# Patient Record
Sex: MALE | Race: WHITE | NOT HISPANIC OR LATINO
[De-identification: names, ages, dates, MRNs, and addresses within clinical notes are randomized per-mention and may not be internally consistent; named-entity substitution may affect disease eponyms.]

---

## 2017-08-08 ENCOUNTER — APPOINTMENT (OUTPATIENT)
Dept: NEUROSURGERY | Facility: HOSPITAL | Age: 72
End: 2017-08-08

## 2017-08-08 ENCOUNTER — RESULT REVIEW (OUTPATIENT)
Age: 72
End: 2017-08-08

## 2017-08-08 ENCOUNTER — INPATIENT (INPATIENT)
Facility: HOSPITAL | Age: 72
LOS: 5 days | Discharge: NURSING, REHAB OR LONG-TERM CARE | DRG: 23 | End: 2017-08-14
Attending: PSYCHIATRY & NEUROLOGY | Admitting: PSYCHIATRY & NEUROLOGY
Payer: MEDICARE

## 2017-08-08 ENCOUNTER — TRANSCRIPTION ENCOUNTER (OUTPATIENT)
Age: 72
End: 2017-08-08

## 2017-08-08 VITALS
OXYGEN SATURATION: 100 % | SYSTOLIC BLOOD PRESSURE: 183 MMHG | HEART RATE: 63 BPM | RESPIRATION RATE: 18 BRPM | DIASTOLIC BLOOD PRESSURE: 98 MMHG | TEMPERATURE: 98 F

## 2017-08-08 DIAGNOSIS — I61.9 NONTRAUMATIC INTRACEREBRAL HEMORRHAGE, UNSPECIFIED: ICD-10-CM

## 2017-08-08 LAB
ALBUMIN SERPL ELPH-MCNC: 4.5 G/DL — SIGNIFICANT CHANGE UP (ref 3.3–5)
ALP SERPL-CCNC: 53 U/L — SIGNIFICANT CHANGE UP (ref 40–120)
ALT FLD-CCNC: 21 U/L RC — SIGNIFICANT CHANGE UP (ref 10–45)
ANION GAP SERPL CALC-SCNC: 12 MMOL/L — SIGNIFICANT CHANGE UP (ref 5–17)
ANION GAP SERPL CALC-SCNC: 14 MMOL/L — SIGNIFICANT CHANGE UP (ref 5–17)
APPEARANCE UR: CLEAR — SIGNIFICANT CHANGE UP
APTT BLD: 30.3 SEC — SIGNIFICANT CHANGE UP (ref 27.5–37.4)
AST SERPL-CCNC: 22 U/L — SIGNIFICANT CHANGE UP (ref 10–40)
BASOPHILS # BLD AUTO: 0 K/UL — SIGNIFICANT CHANGE UP (ref 0–0.2)
BASOPHILS NFR BLD AUTO: 0.7 % — SIGNIFICANT CHANGE UP (ref 0–2)
BILIRUB SERPL-MCNC: 0.4 MG/DL — SIGNIFICANT CHANGE UP (ref 0.2–1.2)
BILIRUB UR-MCNC: NEGATIVE — SIGNIFICANT CHANGE UP
BLD GP AB SCN SERPL QL: NEGATIVE — SIGNIFICANT CHANGE UP
BUN SERPL-MCNC: 10 MG/DL — SIGNIFICANT CHANGE UP (ref 7–23)
BUN SERPL-MCNC: 21 MG/DL — SIGNIFICANT CHANGE UP (ref 7–23)
CALCIUM SERPL-MCNC: 8.3 MG/DL — LOW (ref 8.4–10.5)
CALCIUM SERPL-MCNC: 9.5 MG/DL — SIGNIFICANT CHANGE UP (ref 8.4–10.5)
CHLORIDE SERPL-SCNC: 102 MMOL/L — SIGNIFICANT CHANGE UP (ref 96–108)
CHLORIDE SERPL-SCNC: 106 MMOL/L — SIGNIFICANT CHANGE UP (ref 96–108)
CO2 SERPL-SCNC: 24 MMOL/L — SIGNIFICANT CHANGE UP (ref 22–31)
CO2 SERPL-SCNC: 27 MMOL/L — SIGNIFICANT CHANGE UP (ref 22–31)
COLOR SPEC: COLORLESS — SIGNIFICANT CHANGE UP
CREAT SERPL-MCNC: 0.6 MG/DL — SIGNIFICANT CHANGE UP (ref 0.5–1.3)
CREAT SERPL-MCNC: 0.73 MG/DL — SIGNIFICANT CHANGE UP (ref 0.5–1.3)
DIFF PNL FLD: NEGATIVE — SIGNIFICANT CHANGE UP
EOSINOPHIL # BLD AUTO: 0 K/UL — SIGNIFICANT CHANGE UP (ref 0–0.5)
EOSINOPHIL NFR BLD AUTO: 0.6 % — SIGNIFICANT CHANGE UP (ref 0–6)
GAS PNL BLDA: SIGNIFICANT CHANGE UP
GLUCOSE SERPL-MCNC: 111 MG/DL — HIGH (ref 70–99)
GLUCOSE SERPL-MCNC: 142 MG/DL — HIGH (ref 70–99)
GLUCOSE UR QL: NEGATIVE — SIGNIFICANT CHANGE UP
HCT VFR BLD CALC: 33.3 % — LOW (ref 39–50)
HCT VFR BLD CALC: 41.7 % — SIGNIFICANT CHANGE UP (ref 39–50)
HGB BLD-MCNC: 11.6 G/DL — LOW (ref 13–17)
HGB BLD-MCNC: 14.4 G/DL — SIGNIFICANT CHANGE UP (ref 13–17)
INR BLD: 1.07 RATIO — SIGNIFICANT CHANGE UP (ref 0.88–1.16)
INR BLD: 1.09 RATIO — SIGNIFICANT CHANGE UP (ref 0.88–1.16)
KETONES UR-MCNC: NEGATIVE — SIGNIFICANT CHANGE UP
LEUKOCYTE ESTERASE UR-ACNC: NEGATIVE — SIGNIFICANT CHANGE UP
LYMPHOCYTES # BLD AUTO: 2 K/UL — SIGNIFICANT CHANGE UP (ref 1–3.3)
LYMPHOCYTES # BLD AUTO: 28.5 % — SIGNIFICANT CHANGE UP (ref 13–44)
MAGNESIUM SERPL-MCNC: 2.1 MG/DL — SIGNIFICANT CHANGE UP (ref 1.6–2.6)
MCHC RBC-ENTMCNC: 34.3 PG — HIGH (ref 27–34)
MCHC RBC-ENTMCNC: 34.5 PG — HIGH (ref 27–34)
MCHC RBC-ENTMCNC: 34.6 GM/DL — SIGNIFICANT CHANGE UP (ref 32–36)
MCHC RBC-ENTMCNC: 34.9 GM/DL — SIGNIFICANT CHANGE UP (ref 32–36)
MCV RBC AUTO: 98.8 FL — SIGNIFICANT CHANGE UP (ref 80–100)
MCV RBC AUTO: 99.3 FL — SIGNIFICANT CHANGE UP (ref 80–100)
MONOCYTES # BLD AUTO: 0.5 K/UL — SIGNIFICANT CHANGE UP (ref 0–0.9)
MONOCYTES NFR BLD AUTO: 7.3 % — SIGNIFICANT CHANGE UP (ref 2–14)
NEUTROPHILS # BLD AUTO: 4.5 K/UL — SIGNIFICANT CHANGE UP (ref 1.8–7.4)
NEUTROPHILS NFR BLD AUTO: 62.9 % — SIGNIFICANT CHANGE UP (ref 43–77)
NITRITE UR-MCNC: NEGATIVE — SIGNIFICANT CHANGE UP
PH UR: 7 — SIGNIFICANT CHANGE UP (ref 5–8)
PHOSPHATE SERPL-MCNC: 3.3 MG/DL — SIGNIFICANT CHANGE UP (ref 2.5–4.5)
PLATELET # BLD AUTO: 162 K/UL — SIGNIFICANT CHANGE UP (ref 150–400)
PLATELET # BLD AUTO: 180 K/UL — SIGNIFICANT CHANGE UP (ref 150–400)
POTASSIUM SERPL-MCNC: 3.4 MMOL/L — LOW (ref 3.5–5.3)
POTASSIUM SERPL-MCNC: 3.8 MMOL/L — SIGNIFICANT CHANGE UP (ref 3.5–5.3)
POTASSIUM SERPL-SCNC: 3.4 MMOL/L — LOW (ref 3.5–5.3)
POTASSIUM SERPL-SCNC: 3.8 MMOL/L — SIGNIFICANT CHANGE UP (ref 3.5–5.3)
PROT SERPL-MCNC: 7.5 G/DL — SIGNIFICANT CHANGE UP (ref 6–8.3)
PROT UR-MCNC: NEGATIVE — SIGNIFICANT CHANGE UP
PROTHROM AB SERPL-ACNC: 11.6 SEC — SIGNIFICANT CHANGE UP (ref 9.8–12.7)
PROTHROM AB SERPL-ACNC: 11.9 SEC — SIGNIFICANT CHANGE UP (ref 9.8–12.7)
RBC # BLD: 3.37 M/UL — LOW (ref 4.2–5.8)
RBC # BLD: 4.2 M/UL — SIGNIFICANT CHANGE UP (ref 4.2–5.8)
RBC # FLD: 11.4 % — SIGNIFICANT CHANGE UP (ref 10.3–14.5)
RBC # FLD: 11.4 % — SIGNIFICANT CHANGE UP (ref 10.3–14.5)
RH IG SCN BLD-IMP: POSITIVE — SIGNIFICANT CHANGE UP
RH IG SCN BLD-IMP: POSITIVE — SIGNIFICANT CHANGE UP
SODIUM SERPL-SCNC: 141 MMOL/L — SIGNIFICANT CHANGE UP (ref 135–145)
SODIUM SERPL-SCNC: 144 MMOL/L — SIGNIFICANT CHANGE UP (ref 135–145)
SP GR SPEC: 1.02 — SIGNIFICANT CHANGE UP (ref 1.01–1.02)
UROBILINOGEN FLD QL: NEGATIVE — SIGNIFICANT CHANGE UP
WBC # BLD: 10 K/UL — SIGNIFICANT CHANGE UP (ref 3.8–10.5)
WBC # BLD: 7.2 K/UL — SIGNIFICANT CHANGE UP (ref 3.8–10.5)
WBC # FLD AUTO: 10 K/UL — SIGNIFICANT CHANGE UP (ref 3.8–10.5)
WBC # FLD AUTO: 7.2 K/UL — SIGNIFICANT CHANGE UP (ref 3.8–10.5)

## 2017-08-08 PROCEDURE — 88342 IMHCHEM/IMCYTCHM 1ST ANTB: CPT | Mod: 26

## 2017-08-08 PROCEDURE — 99292 CRITICAL CARE ADDL 30 MIN: CPT

## 2017-08-08 PROCEDURE — 70450 CT HEAD/BRAIN W/O DYE: CPT | Mod: 26,77

## 2017-08-08 PROCEDURE — 61313 CRNEC/CRNOT STTL ICERE: CPT

## 2017-08-08 PROCEDURE — 88307 TISSUE EXAM BY PATHOLOGIST: CPT | Mod: 26

## 2017-08-08 PROCEDURE — 70450 CT HEAD/BRAIN W/O DYE: CPT | Mod: 26

## 2017-08-08 PROCEDURE — 99291 CRITICAL CARE FIRST HOUR: CPT

## 2017-08-08 PROCEDURE — 93010 ELECTROCARDIOGRAM REPORT: CPT

## 2017-08-08 PROCEDURE — 99285 EMERGENCY DEPT VISIT HI MDM: CPT | Mod: 25,GC

## 2017-08-08 RX ORDER — DEXMEDETOMIDINE HYDROCHLORIDE IN 0.9% SODIUM CHLORIDE 4 UG/ML
0.2 INJECTION INTRAVENOUS
Qty: 200 | Refills: 0 | Status: DISCONTINUED | OUTPATIENT
Start: 2017-08-08 | End: 2017-08-08

## 2017-08-08 RX ORDER — DESMOPRESSIN ACETATE 0.1 MG/1
25.5 TABLET ORAL ONCE
Qty: 0 | Refills: 0 | Status: DISCONTINUED | OUTPATIENT
Start: 2017-08-08 | End: 2017-08-08

## 2017-08-08 RX ORDER — ONDANSETRON 8 MG/1
4 TABLET, FILM COATED ORAL EVERY 6 HOURS
Qty: 0 | Refills: 0 | Status: DISCONTINUED | OUTPATIENT
Start: 2017-08-08 | End: 2017-08-14

## 2017-08-08 RX ORDER — ACETAMINOPHEN 500 MG
650 TABLET ORAL EVERY 6 HOURS
Qty: 0 | Refills: 0 | Status: DISCONTINUED | OUTPATIENT
Start: 2017-08-08 | End: 2017-08-14

## 2017-08-08 RX ORDER — SODIUM CHLORIDE 9 MG/ML
250 INJECTION INTRAMUSCULAR; INTRAVENOUS; SUBCUTANEOUS ONCE
Qty: 0 | Refills: 0 | Status: COMPLETED | OUTPATIENT
Start: 2017-08-08 | End: 2017-08-08

## 2017-08-08 RX ORDER — ONDANSETRON 8 MG/1
4 TABLET, FILM COATED ORAL ONCE
Qty: 0 | Refills: 0 | Status: COMPLETED | OUTPATIENT
Start: 2017-08-08 | End: 2017-08-08

## 2017-08-08 RX ORDER — DESMOPRESSIN ACETATE 0.1 MG/1
26 TABLET ORAL ONCE
Qty: 0 | Refills: 0 | Status: COMPLETED | OUTPATIENT
Start: 2017-08-08 | End: 2017-08-08

## 2017-08-08 RX ORDER — MANNITOL
100 POWDER (GRAM) MISCELLANEOUS ONCE
Qty: 0 | Refills: 0 | Status: DISCONTINUED | OUTPATIENT
Start: 2017-08-08 | End: 2017-08-08

## 2017-08-08 RX ORDER — LEVETIRACETAM 250 MG/1
500 TABLET, FILM COATED ORAL EVERY 12 HOURS
Qty: 0 | Refills: 0 | Status: DISCONTINUED | OUTPATIENT
Start: 2017-08-08 | End: 2017-08-09

## 2017-08-08 RX ORDER — POTASSIUM CHLORIDE 20 MEQ
10 PACKET (EA) ORAL
Qty: 0 | Refills: 0 | Status: COMPLETED | OUTPATIENT
Start: 2017-08-08 | End: 2017-08-09

## 2017-08-08 RX ORDER — MANNITOL
100 POWDER (GRAM) MISCELLANEOUS ONCE
Qty: 0 | Refills: 0 | Status: COMPLETED | OUTPATIENT
Start: 2017-08-08 | End: 2017-08-08

## 2017-08-08 RX ORDER — DEXTROSE MONOHYDRATE, SODIUM CHLORIDE, AND POTASSIUM CHLORIDE 50; .745; 4.5 G/1000ML; G/1000ML; G/1000ML
1000 INJECTION, SOLUTION INTRAVENOUS
Qty: 0 | Refills: 0 | Status: DISCONTINUED | OUTPATIENT
Start: 2017-08-08 | End: 2017-08-09

## 2017-08-08 RX ORDER — DEXMEDETOMIDINE HYDROCHLORIDE IN 0.9% SODIUM CHLORIDE 4 UG/ML
0.2 INJECTION INTRAVENOUS
Qty: 200 | Refills: 0 | Status: DISCONTINUED | OUTPATIENT
Start: 2017-08-08 | End: 2017-08-09

## 2017-08-08 RX ORDER — DOCUSATE SODIUM 100 MG
100 CAPSULE ORAL THREE TIMES A DAY
Qty: 0 | Refills: 0 | Status: DISCONTINUED | OUTPATIENT
Start: 2017-08-08 | End: 2017-08-14

## 2017-08-08 RX ORDER — FENTANYL CITRATE 50 UG/ML
25 INJECTION INTRAVENOUS ONCE
Qty: 0 | Refills: 0 | Status: DISCONTINUED | OUTPATIENT
Start: 2017-08-08 | End: 2017-08-08

## 2017-08-08 RX ORDER — POTASSIUM CHLORIDE 20 MEQ
40 PACKET (EA) ORAL ONCE
Qty: 0 | Refills: 0 | Status: DISCONTINUED | OUTPATIENT
Start: 2017-08-08 | End: 2017-08-08

## 2017-08-08 RX ORDER — LEVETIRACETAM 250 MG/1
1000 TABLET, FILM COATED ORAL ONCE
Qty: 0 | Refills: 0 | Status: COMPLETED | OUTPATIENT
Start: 2017-08-08 | End: 2017-08-08

## 2017-08-08 RX ORDER — ACETAMINOPHEN 500 MG
650 TABLET ORAL EVERY 6 HOURS
Qty: 0 | Refills: 0 | Status: DISCONTINUED | OUTPATIENT
Start: 2017-08-08 | End: 2017-08-09

## 2017-08-08 RX ORDER — METOCLOPRAMIDE HCL 10 MG
10 TABLET ORAL ONCE
Qty: 0 | Refills: 0 | Status: COMPLETED | OUTPATIENT
Start: 2017-08-08 | End: 2017-08-08

## 2017-08-08 RX ADMIN — Medication 1000 GRAM(S): at 12:45

## 2017-08-08 RX ADMIN — ONDANSETRON 4 MILLIGRAM(S): 8 TABLET, FILM COATED ORAL at 05:44

## 2017-08-08 RX ADMIN — DESMOPRESSIN ACETATE 226 MICROGRAM(S): 0.1 TABLET ORAL at 13:40

## 2017-08-08 RX ADMIN — SODIUM CHLORIDE 500 MILLILITER(S): 9 INJECTION INTRAMUSCULAR; INTRAVENOUS; SUBCUTANEOUS at 20:15

## 2017-08-08 RX ADMIN — LEVETIRACETAM 400 MILLIGRAM(S): 250 TABLET, FILM COATED ORAL at 07:51

## 2017-08-08 RX ADMIN — LEVETIRACETAM 400 MILLIGRAM(S): 250 TABLET, FILM COATED ORAL at 22:20

## 2017-08-08 RX ADMIN — SODIUM CHLORIDE 1500 MILLILITER(S): 9 INJECTION INTRAMUSCULAR; INTRAVENOUS; SUBCUTANEOUS at 20:00

## 2017-08-08 RX ADMIN — FENTANYL CITRATE 25 MICROGRAM(S): 50 INJECTION INTRAVENOUS at 19:30

## 2017-08-08 RX ADMIN — DEXTROSE MONOHYDRATE, SODIUM CHLORIDE, AND POTASSIUM CHLORIDE 100 MILLILITER(S): 50; .745; 4.5 INJECTION, SOLUTION INTRAVENOUS at 20:15

## 2017-08-08 RX ADMIN — Medication 10 MILLIGRAM(S): at 06:36

## 2017-08-08 RX ADMIN — ONDANSETRON 4 MILLIGRAM(S): 8 TABLET, FILM COATED ORAL at 07:45

## 2017-08-08 RX ADMIN — FENTANYL CITRATE 25 MICROGRAM(S): 50 INJECTION INTRAVENOUS at 19:45

## 2017-08-08 NOTE — ED PROVIDER NOTE - MEDICAL DECISION MAKING DETAILS
Jaqui SALAZAR: 71 y/o male with PMH of HTN and BPH here with HA. Patient is A/OX3 and reports waking up with severe R sided HA described as throbbing that radiated from the back of his R eye to his R occipitum. Patient also endorses inability to walk and come confusion. Endorses some nausea. Denies trauma, neck pain, similar HA in the past, CP, SOB, palpitations, fever or skin changes or visual changes. Exam shows a man in moderate distress A/XO3 and w/o focal weakness. Lungs CTAB. Cardiac S1S2 noted, RRR. Patient with dysdiadocokenesia and mild cerebellar dysfunction. EOMI, PERRL. Consider SAH, Posterior CVA, Vertebral dissection, TGA. Plan CBC, CMP, CT head, UA, Coags and Type and Screen. Pain control and Supplemental Oxygen and BP control if needed. Reassess.

## 2017-08-08 NOTE — ED PROVIDER NOTE - NS ED ROS FT
Gen: Appears in pain, AOx3  Head: NCAT  HEENT: PERRL, oral mucosa moist, normal conjunctiva  Lung: CTAB, no respiratory distress  CV: rrr, no murmurs, Normal perfusion  Abd: soft, NTND, no CVA tenderness  MSK: No edema, no visible deformities  Neuro: CN intact, motor decreased in right upper and lower extremities, sensation intact, past pointing on finger to nose, gait instability  Skin: No rash   Psych: normal affect

## 2017-08-08 NOTE — CONSULT NOTE ADULT - SUBJECTIVE AND OBJECTIVE BOX
Patient is a 72y old  Male who presents with a chief complaint of Severe R sided headache    HPI:  Mr. De La Cruz is a 71 y/o man w/ PMH of BPH, HLD, GERD, herniated cervical disc presenting with R sided headache. Last known normal was before he went to bed last night around 11pm. Pt awoke early in the morning at around 2 AM with a severe throbbing headache that behind his R eye that radiates to R occiput and down into his R neck. The pain has not spread, gotten more severe, or radiated anywhere since HA began. Pt denies any dysarthria, dysphagia, or visual changes. PT does endorse nausea, photophobia, dizziness, and lightheadedness.   Per collateral from pts girlfriend, pt called her when he got the HA and felt like he couldn't walk. When she arrived to pick him up, he was speaking fine but seemed to be confused, unable to walk, or see some things that were in front of him.    Son (John [phone], [phone]) & daughter (Shiela De Oliveira) who are both away in [place]. Girlfriend provided collateral information.       Neurological Review of Systems:  No difficulty with language.  No vision loss or double vision.  No dizziness, vertigo or new hearing loss.  No difficulty with speech or swallowing.    No numbness or tingling in the bilateral lower extremities.  No difficulty with balance.  No difficulty with ambulation.                Allergies    No Known Allergies    Intolerances      PAST MEDICAL & SURGICAL HISTORY:  Herniated disc, cervical  BPH (benign prostatic hypertrophy)  GERD (gastroesophageal reflux disease)  Hyperlipidemia  S/P angioplasty: 2014 - [facility]  IH (inguinal hernia): Left Inguinal hernia repair 14  After cataract: Right Eye IOL    FAMILY HISTORY:  Family history of breast cancer    SOCIAL HISTORY: non smoker/ former smoker/ active smoker    Review of Systems:  Constitutional: No generalized weakness. No fevers or chills.                    Eyes, Ears, Mouth, Throat: No vision loss   Respiratory: No shortness of breath or cough.                                Cardiovascular: No chest pain or palpitations  Gastrointestinal: No nausea or vomiting.                                         Genitourinary: No urinary incontinence or burning on urination.  Musculoskeletal: No joint pain.                                                           Dermatologic: No rash.  Neurological: as per HPI                                                                      Psychiatric: No behavioral problems.  Endocrine: No known hypoglycemia.               Hematologic/Lymphatic: No easy bleeding.    O:  Vital Signs Last 24 Hrs  T(C): 36.7 (08 Aug 2017 06:28), Max: 36.7 (08 Aug 2017 05:01)  T(F): 98 (08 Aug 2017 06:28), Max: 98 (08 Aug 2017 05:01)  HR: 71 (08 Aug 2017 07:00) (62 - 71)  BP: 156/86 (08 Aug 2017 07:00) (142/84 - 183/98)  BP(mean): --  RR: 18 (08 Aug 2017 07:00) (17 - 18)  SpO2: 100% (08 Aug 2017 07:00) (99% - 100%)    General Exam:   General appearance: No acute distress                 Cardiovascular: Pedal dorsalis pulses intact bilaterally        General: Pt appears drowsy or lethargic laying in bed. Responds to verbal stimuli, sometimes needs repetition of questions or commands to elicit a response.     Mental Status: Pt is A+Ox4. Oriented to self, place, date, and current president. Attention impaired. No dysarthria or aphasia. L sided hemineglect. Able to follow some simple commands.     Cranial nerves: Pupils are 2-3 mm bilaterally with diminished response. Extraocular movement unable to be assessed due to noncompliance. CN V1-V3 intact to touch bilaterally. Intact blink to threat on R, no blink to threat on L. Facial nerve intact. Hearing not assessed due to noncompliance, but pt has history of reduced hearing in L ear per girlfriend. Tongue midline. Trapezius not assessed due to noncompliance.     Motor:  Strength 5/5 in RUE and RLE. Strength 2/5 in LUE and LLE, moves in response to noxious stimuli.  Tone normal throughout  Pronator drift not assessed due to noncompliance     Sensory:  Light touch sensation intact on R side. Intact to noxious stimuli on L            LABS:                        14.4   7.2   )-----------( 162      ( 08 Aug 2017 05:46 )             41.7     08-    141  |  102  |  21  ----------------------------<  111<H>  3.8   |  27  |  0.73    Ca    9.5      08 Aug 2017 05:46    TPro  7.5  /  Alb  4.5  /  TBili  0.4  /  DBili  x   /  AST  22  /  ALT  21  /  AlkPhos  53  08-    PT/INR - ( 08 Aug 2017 05:46 )   PT: 11.6 sec;   INR: 1.07 ratio           Urinalysis Basic - ( 08 Aug 2017 05:49 )    Color: x / Appearance: Clear / S.016 / pH: x  Gluc: x / Ketone: Negative  / Bili: Negative / Urobili: Negative   Blood: x / Protein: Negative / Nitrite: Negative   Leuk Esterase: Negative / RBC: x / WBC x   Sq Epi: x / Non Sq Epi: x / Bacteria: x          RADIOLOGY & ADDITIONAL STUDIES:    CT H: Right  temporo-occipital  intraparenchymal  hematoma 6.2  x  4  x  4.8  cm with  associated  leftward

## 2017-08-08 NOTE — DISCHARGE NOTE ADULT - NS AS DC STROKE ED MATERIALS
Risk Factors for Stroke/Need for Followup After Discharge/Stroke Education Booklet/Stroke Warning Signs and Symptoms/Call 911 for Stroke/Prescribed Medications

## 2017-08-08 NOTE — DISCHARGE NOTE ADULT - PATIENT PORTAL LINK FT
“You can access the [facility] Patient Portal, offered by [facility], by registering with the following website: [email or web address]”

## 2017-08-08 NOTE — CONSULT NOTE ADULT - ASSESSMENT
Mr. De La Cruz is a 73 y/o man w/ PMH of BPH, HLD, GERD, herniated cervical disc presenting with R sided headache with phys exam sign for right gaze preference and left field cut, left sided weakness and neglect with a NIHSS 9. MRS 0. ICH 2.

## 2017-08-08 NOTE — CONSULT NOTE ADULT - PROBLEM SELECTOR RECOMMENDATION 9
[] MRI brain w/wo  [] vessel imaging: CTA H/N vs MRA H/N  [] BP <140/90  [] DVT ppx  [] Neurocheck and vitals q 4h  [] pt/ot. s/s

## 2017-08-08 NOTE — DISCHARGE NOTE ADULT - CARE PROVIDER_API CALL
Dilma Willoughby (MD; PhD), Neurological Surgery  [address] [address]  Phone: [phone]  Fax: [phone]    Harvey Liriano  Phone: [phone]  Fax: (   )    -

## 2017-08-08 NOTE — PROGRESS NOTE ADULT - ASSESSMENT
R ICH, likely primary ischemic stroke with secondary hemorrhagic conversion    NEURO: neurochecks q1h, stat mannitol prior to OR, plan for R decompressive hemicraniectomy, obtain head/neck vascular imaging when stable, MRI when stable, ASA held due to hemorrhagic conversion, stroke core measures, stroke consult, CT in AM, keppra for sz ppx   Activity: [] mobilize as tolerated [X] Bedrest [] PT [] OT [] PMNR    PULM: protecting airway, low threshold for intubation    CV: TTE, atorvastatin 80mg/d   SBP goal 100-150    RENAL: monitor chemistries, supplement electrolytes as indicated, place butler  Fluids: 2%@100ml/hr    GI: NPO for OR, NGT and TF after return  Diet: NPO  GI prophylaxis [] not indicated [] PPI [] other:  Bowel regimen [] colace [] senna [] other:    ENDO: send A1c, lipid panel  Goal euglycemia (-180)    HEME/ONC: monitor h/h, hold chemoppx given ICH. ddavp and 2u platelets due to daily ASA  VTE prophylaxis: [] SCDs [] chemoprophylaxis [] high risk of DVT/PE on admission due to:    ID: periop abx    MISC: PT/OT when stable    SOCIAL/FAMILY:   [] awaiting [X] updated at bedside [] family meeting    CODE STATUS:  [X] Full Code [] DNR [] DNI [] Palliative/Comfort Care    DISPOSITION:  [X] ICU [] Stroke Unit [] Floor [] EMU [] RCU [] PCU    [X] Patient is at high risk of neurologic deterioration/death due to: herniation, brain compression, respiratory failure    Time seen: 1210  Time spent: 80 [X] critical care minutes

## 2017-08-08 NOTE — ED PROVIDER NOTE - ATTENDING CONTRIBUTION TO CARE
Attending MD Nails:  I personally have seen and examined this patient.  Resident note reviewed and agree on plan of care and except where noted.  See MDM for details.

## 2017-08-08 NOTE — PROGRESS NOTE ADULT - SUBJECTIVE AND OBJECTIVE BOX
HPI:  72M w/ PMHx of BPH, HLD, GERD, herniated cervical disc who presented with R sided headache. Last known normal was before he went to bed on 8/7 at 11pm. Pt awoke early in the morning at around 2 AM with a severe throbbing headache that behind his R eye that radiated to R occiput and down into his R neck. Pt called his girlfriend and she reports that on arrival she noted that he was unable to walk, seemed confused and had difficulty seeing properly.  He was admitted to [facility] ED at 0500, CT findings suggestive of ischemic stroke with hemorrhagic conversion.  Neurology saw patient at approximately 1000h reported NIHSS of 9, at 1210 patient was assessed by [facility] hal, NIHSS 19.    Son (John [phone], [phone]) & daughter (Shiela De Oliveira) who are both away in [place]. Girlfriend provided collateral information.    SURGERY:   PAST MEDICAL HISTORY: Herniated disc, cervical  BPH (benign prostatic hypertrophy)  GERD (gastroesophageal reflux disease)  Hyperlipidemia    PAST SURGICAL HISTORY: S/P angioplasty  IH (inguinal hernia)  After cataract    FAMILY HISTORY:  Family history of breast cancer    ALLERGIES: No Known Allergies    **************************************  **************************************    ROS  Unobtainable due to mental status [X] Negative []  Positives:    ADMISSION SCORES: NIHSS 19    ICU Vital Signs Last 24 Hrs  T(C): 36.7 (08 Aug 2017 06:28), Max: 36.7 (08 Aug 2017 05:01)  T(F): 98 (08 Aug 2017 06:28), Max: 98 (08 Aug 2017 05:01)  HR: 71 (08 Aug 2017 07:00) (62 - 71)  BP: 156/86 (08 Aug 2017 07:00) (142/84 - 183/98)  BP(mean): --  ABP: --  ABP(mean): --  RR: 18 (08 Aug 2017 07:00) (17 - 18)  SpO2: 100% (08 Aug 2017 07:00) (99% - 100%)          DEVICES: [] Restraints [] SRINI/HMV []LD [] ET tube [] Trach [] Chest Tube [] A-line [] Brice [] NGT [] Rectal Tube [] EVD [] CVL  [] ICP/LiCOx    NEUROIMAGING: R posterior parietal lucency with intraparenchymal hemorrhage    MEDICATIONS:  mannitol 25% IVPB 100 Gram(s) IV Intermittent once      PHYSICAL EXAM:  General: mild distress, appears uncomfortable  Neurological: lethargic, arousable to voice but difficult to maintain attention, cannot count 20-1, oriented to self only, pupils 2mm sluggishly reactive, midline gaze, reduced blink to threat from L, RUE/RLE at least 4+/5, LUE/LLE plegic. responds to noxious stimulus throughout. Visual and tactile hemineglect.   Lungs: Clear to auscultation  Heart: S1S2  Abdomen: Soft, nontender, BS present  Extremities: No edema      LABS:                        14.4   7.2   )-----------( 162      ( 08 Aug 2017 05:46 )             41.7    08-08    141  |  102  |  21  ----------------------------<  111<H>  3.8   |  27  |  0.73    Ca    9.5      08 Aug 2017 05:46    TPro  7.5  /  Alb  4.5  /  TBili  0.4  /  DBili  x   /  AST  22  /  ALT  21  /  AlkPhos  53  08-08    Lipids and LFTs 08-08 @ 05:46  --  --  --  --  --  21  4.5  53  22  --  0.4  --  7.5      CARDIAC MARKERS ( 08 Aug 2017 05:46 )  x     / <0.01 ng/mL / 85 U/L / x     / x

## 2017-08-08 NOTE — DISCHARGE NOTE ADULT - PLAN OF CARE
Prevention of recurrence -Continue to take your medications as prescribed  -Follow up with Vascular Neurology - Dr. Liriano  -Follow up with Neurosurgery - Dr. Willoughby  -Repeat MRI brain in 2-3 months

## 2017-08-08 NOTE — DISCHARGE NOTE ADULT - MEDICATION SUMMARY - MEDICATIONS TO TAKE
I will START or STAY ON the medications listed below when I get home from the hospital:    oxyCODONE 5 mg oral tablet  -- 1 tab(s) by mouth every 4 hours, As needed, Moderate Pain  -- Indication: For Pain control    acetaminophen 160 mg/5 mL oral suspension  -- 20.31 milliliter(s) by mouth every 6 hours, As needed, Mild Pain (1 - 3)  -- Indication: For Pain control    acetaminophen 325 mg oral tablet  -- 2 tab(s) by mouth every 6 hours, As needed, For Temp greater than 38 C (100.4 F)  -- Indication: For Pain Control    bisacodyl 5 mg oral delayed release tablet  -- 1 tab(s) by mouth every 12 hours, As needed, Constipation  -- Indication: For Constipation    docusate sodium 100 mg oral capsule  -- 1 cap(s) by mouth 3 times a day  -- Indication: For Constipation    polyethylene glycol 3350 oral powder for reconstitution  -- 17 gram(s) by mouth once a day  -- Indication: For Constipation

## 2017-08-08 NOTE — ED ADULT NURSE REASSESSMENT NOTE - NS ED NURSE REASSESS COMMENT FT1
Pt transported to OR emergently by MD Bean Loo and another MD.
earlier, pt voided in urinal but spilled the urine in the bed
VSS. Upon assessment, pt had a decrease vision loss on left peripheral side. SO states this is a new finding. Pt appears to be more sleepy and complaining about a right eye headache and nausea. Made MD Leggett aware. Zofran given. Pt denies chest pain, sob, dizziness, palpitations. Neuro assessment is on flow sheet. Awaiting neurosurg and neuro consult. Awaiting repeat ct scan.  Pt has left arm weakness, strong strength on right arm. Repeat CT scan ordered. Awaiting intervention. Safety and comfort measures maintained. Seizure precautions in place. Family at bedside.

## 2017-08-08 NOTE — CONSULT NOTE ADULT - SUBJECTIVE AND OBJECTIVE BOX
p ([id number])     Patient is a 72y old  Male who presents with a chief complaint of Severe R sided headache    HPI:  Mr. De La Cruz is a 71 y/o man w/ PMH of BPH, HLD, GERD, herniated cervical disc presenting with R sided headache. Last known normal was before he went to bed last night around 11pm. Pt awoke early in the morning at around 2 AM with a severe throbbing headache that behind his R eye that radiates to R occiput and down into his R neck. The pain has not spread, gotten more severe, or radiated anywhere since HA began. Pt denies any dysarthria, dysphagia, or visual changes. PT does endorse nausea, photophobia, dizziness, and lightheadedness.   Per collateral from pts girlfriend, pt called her when he got the HA and felt like he couldn't walk. When she arrived to pick him up, he was speaking fine but seemed to be confused, unable to walk, or see some things that were in front of him.    Son (John [phone], [phone]) & daughter (Shiela De Oliveira) who are both away in [place]. Girlfriend provided collateral information.    PAST MEDICAL HISTORY   Herniated disc, cervical  BPH (benign prostatic hypertrophy)  GERD (gastroesophageal reflux disease)  Hyperlipidemia    PAST SURGICAL HISTORY   S/P angioplasty  IH (inguinal hernia)  After cataract        MEDICATIONS:  Antibiotics:    Neuro:  Anticoagulation:    Other:      SOCIAL HISTORY:   Occupation:   Marital Status:     FAMILY HISTORY:  Family history of breast cancer      REVIEW OF SYSTEMS:  Check here if all are normal other than Neurological [x]  General:  Eyes:  ENT:  Cardiac:  Respiratory:  GI:  Musculoskeletal:   Skin:  Neurologic:   Psychiatric:     PHYSICAL EXAMINATION:   T(C): 36.7 (17 @ 06:28), Max: 36.7 (17 @ 05:01)  HR: 71 (17 @ 07:00) (62 - 71)  BP: 156/86 (17 @ 07:00) (142/84 - 183/98)  RR: 18 (17 @ 07:00) (17 - 18)  SpO2: 100% (17 @ 07:00) (99% - 100%)  Wt(kg): --    General Examination:      Pt appears drowsy or lethargic laying in bed. Responds to verbal stimuli, sometimes needs repetition of questions or commands to elicit a response.      A+Ox4.  Attention impaired. No dysarthria or aphasia. L sided hemineglect. Able to follow some simple commands.     Cranial nerves: PERRLA, EOMI, Intact blink to threat on R, no blink to threat on L. Facial nerve intact. Tongue midlin     Motor:  Strength 5/5 in RUE and RLE. Strength 2/5 in LUE and LLE, moves in response to noxious stimuli  Pronator drift not assessed due to noncompliance     Sensory:  Light touch sensation intact on R side. Intact to noxious stimuli on L:     LABS:                        14.4   7.2   )-----------( 162      ( 08 Aug 2017 05:46 )             41.7     08    141  |  102  |  21  ----------------------------<  111<H>  3.8   |  27  |  0.73    Ca    9.5      08 Aug 2017 05:46    TPro  7.5  /  Alb  4.5  /  TBili  0.4  /  DBili  x   /  AST  22  /  ALT  21  /  AlkPhos  53  08    PT/INR - ( 08 Aug 2017 05:46 )   PT: 11.6 sec;   INR: 1.07 ratio           Urinalysis Basic - ( 08 Aug 2017 05:49 )    Color: x / Appearance: Clear / S.016 / pH: x  Gluc: x / Ketone: Negative  / Bili: Negative / Urobili: Negative   Blood: x / Protein: Negative / Nitrite: Negative   Leuk Esterase: Negative / RBC: x / WBC x   Sq Epi: x / Non Sq Epi: x / Bacteria: x        RADIOLOGY & ADDITIONAL STUDIES:  IMPRESSION:   Severely motion degraded CT scan.  There is limited visualization of a   large acute intraparenchymal hemorrhage centered in the right   temporal-occipital region that measures approximately 6 x 3.5x 4 cm.    Mild surrounding vasogenic edema and mass effect.  No evidence of brain   herniation or hydrocephalus hydrocephalus.  The hemorrhage may be due to   hypertensive bleed or hemorrhagic transformation of an infarct.    Short-term interval follow-up head CT scan in approximately four hours is   recommended to exclude interval enlargement of the hematoma.  CTA can be   performed when the patient is able to cooperate.                    ELEONORA GRAVES M.D.,ATTENDING RADIOLOGIST  This document has been electronically signed. Aug  8 2017  6:30AM

## 2017-08-08 NOTE — PROGRESS NOTE ADULT - ASSESSMENT
ASSESSMENT: intracerebral hemorrhage, post-operative day 0 from craniotomy for clot evacuation     PLAN:  Feeding: [] diet [] tube feeds  Analgesia/Sedation:   Seizure prophylaxis: [] not indicated  Thromboprophylaxis: [] SCDs [] chemoprophylaxis [] hold chemoprophylaxis due to: [] high risk of DVT/PE on admission due to:  Head of Bed/Activity: [] 30 degrees [] mobilize as tolerated [] PT [] OT [] PMNR  Ulcer prophylaxis: [] not indicated [] PPI [] other:  Glucose Control: Goal -180 [] RISS [] other:    [] Patient critically ill due to:    Time Seen:  Time Spent: __ [] critical care minutes    Contact: [phone] ASSESSMENT: intracerebral hemorrhage, post-operative day 0 from craniotomy for clot evacuation     PLAN:  Seizure prophylaxis per Neurosurgery  MRI with vascular imaging when able for stroke w/u  Wean to extubate  Feeding: [] diet [] tube feeds [x] NPO for possible extubation  Analgesia/Sedation: currently off sedation   Thromboprophylaxis: [x] SCDs [] chemoprophylaxis: post-operative day 1 if CT without significant heme [x] hold chemoprophylaxis due to: fresh intracerebral hemorrhage and post-operative day 0 [] high risk of DVT/PE on admission due to:  Head of Bed/Activity: [x] 30 degrees [x] mobilize as tolerated [x] PT [x] OT [] PMNR  Ulcer prophylaxis: [] not indicated [x] PPI: intubated [] other:    [x] Patient critically ill due to: intracerebral hemorrhage with brain compression    Time Seen: 9:30PM  Time Spent: 40 [x] critical care minutes    Contact: [phone]

## 2017-08-08 NOTE — ED ADULT NURSE NOTE - PMH
BPH (benign prostatic hypertrophy)    GERD (gastroesophageal reflux disease)    Herniated disc, cervical    Hyperlipidemia

## 2017-08-08 NOTE — ED ADULT NURSE NOTE - OBJECTIVE STATEMENT
woke up at 2 am with headache; felt nausea but no vomit; had trouble reading the digital alarm clock; called girlfriend on phone at 2:50 am who then went to see him; girlfriend pt was disoriented and unsteady on feet; brought pt to ED; pt took 3 baby aspirin chewable at home; now pt is alert and oriented x3; follows commands; moves all extremites equally but headache persists woke up at 2 am with headache; felt nausea but no vomit; had trouble reading the digital alarm clock; called girlfriend on phone at 2:50 am who then went to see him; girlfriend pt was disoriented and unsteady on feet; brought pt to ED; pt took 3 baby aspirin chewable at home; now pt is alert and oriented x3; follows commands; moves all extremites equally but headache persists; 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

## 2017-08-08 NOTE — ED ADULT NURSE NOTE - PSH
After cataract  Right Eye IOL  IH (inguinal hernia)  Left Inguinal hernia repair 7/5/14  S/P angioplasty  2014 - [facility]

## 2017-08-08 NOTE — CONSULT NOTE ADULT - ATTENDING COMMENTS
I have seen and examined this patient with the stroke neurology team.     History was reviewed with the patient and available family members.   ROS: All negative except documented in the HPI.   Neurological exam was performed and agree with exam as documented above.   Laboratory results and imaging studies were reviewed by me.   I agree with the neurology resident note as documented above.    72 years old man with vascular risk factors of age and hyperlipidemia is evaluated at [facility] for acute onset of headache and left sided weakness since 8/8/17. Neurological examination shows left homonymous hemianopsia, left nasolabial flattening, left hemiparesis (LUE 3/5 and LLE 4/5) and left motor neglect. CT brain on admission showed right tempo-parieto-occipital ICH with surrounding vasogenic edema leading to mass effect and bring compression. He subsequently underwent craniectomy and hematoma evacuation.    Impression:  Nontraumatic right hemispheric cortically located ICH. Right tempo-parieto-occipital ICH - likely etiology being possible cerebral amyloid angiopathy versus hemorrhagic transformation of ischemic stroke, favoring the former.  Associated vasogenic edema leading to mass effect and bring compression    Plan:  - Avoid any antiplatelet medications or therapeutic anticoagulation for now   - BP goal: Allow permissive HTN with SBP up to 140-179 mmHg for first 24 hours followed by gradual normotension   - Repeat brain imaging in 48 hours to determine the candidacy for pharmacological DVT prophylaxis. SCDs for DVT prophylaxis in the interim   - MRI brain with and without contrast / MRA head and neck with and without contrast   - HbA1C and LDL, continue with aggressive vascular risk factors modifications  - TTE to look for LVH concerning for long standing hypertension   - PT/OT/Speech and swallow evaluation     Above mentioned plan was discussed with patient and available family members at bedside. All the questions were answered and concerns were addressed. I have seen and examined this patient with the stroke neurology team.     History was reviewed with the patient and available family members.   ROS: All negative except documented in the HPI.   Neurological exam was performed and agree with exam as documented above.   Laboratory results and imaging studies were reviewed by me.   I agree with the neurology resident note as documented above.    72 years old man with vascular risk factors of age and hyperlipidemia is evaluated at [facility] for acute onset of headache and left sided weakness since 8/8/17. Neurological examination shows left homonymous hemianopsia, left nasolabial flattening, left hemiparesis (LUE 3/5 and LLE 4/5) and left motor neglect. CT brain on admission showed right tempo-parieto-occipital ICH with surrounding vasogenic edema leading to mass effect and bring compression. He subsequently underwent craniectomy and hematoma evacuation.    Impression:  Nontraumatic right hemispheric cortically located ICH. Right tempo-parieto-occipital ICH - likely etiology being possible cerebral amyloid angiopathy versus hemorrhagic transformation of ischemic stroke, favoring the former.  Associated vasogenic edema leading to mass effect and bring compression    Plan:  - Avoid any antiplatelet medications or therapeutic anticoagulation for now   - BP goal: Allow permissive HTN with SBP up to 140-179 mmHg for first 24 hours followed by gradual normotension   - Repeat brain imaging in 48 hours to determine the candidacy for pharmacological DVT prophylaxis. SCDs for DVT prophylaxis in the interim   - MRI brain with and without contrast / MRA head and neck with and without contrast   - HbA1C and LDL, continue with aggressive vascular risk factors modifications  - TTE to look for LVH concerning for long standing hypertension   - Evaluation and management of increased ICP as per NSCU   - PT/OT/Speech and swallow evaluation     Above mentioned plan was discussed with patient and available family members at bedside. All the questions were answered and concerns were addressed.

## 2017-08-08 NOTE — ED PROVIDER NOTE - PROGRESS NOTE DETAILS
Erin Mohan, DO: Pt signed out to myself & Dr. Leggett. Worsening of clinical condition. L hemineglect & L weakness. A&Ox3 with significant HA & nausea. D/w Neurosurgery - will come evaluate pt. Pt to go for repeat CT scan - CT scanner ready & aware. Erin Mohan, DO: NSGx evaluted pt. Agree with exam. Pending repeat CT. D/w CT scan again - will send for pt. Neuro paged.

## 2017-08-08 NOTE — PROGRESS NOTE ADULT - SUBJECTIVE AND OBJECTIVE BOX
NEUROCRITICAL CARE ATTENDING EVENING NOTE    BRIEF SUMMARY:  72 year-old man with history of hyperlipidemia and coronary artery disease presented 8/8/17 with headache, nausea and confusion and was found to have a right temporo-occipital intracerebral hemorrhage for which he underwent craniotomy and clot evacuation. He was left intubated at request of neurosurgeon. Of note, he had taken three aspirins for headaches and so was given ddAVP and platelet transfusions.     VITALS: [x] Reviewed    IMAGING/DATA: [x] Reviewed    IVF FLUIDS/MEDICATIONS: [x] Reviewed    ALLERGIES:  No Known Allergies    EXAMINATION: NEUROCRITICAL CARE ATTENDING EVENING NOTE    BRIEF SUMMARY:  72 year-old man with history of hyperlipidemia and coronary artery disease presented 8/8/17 with headache, nausea and confusion and was found to have a right temporo-occipital intracerebral hemorrhage for which he underwent craniotomy and clot evacuation. He was left intubated at request of neurosurgeon. Of note, he had taken three aspirins for headaches and so was given ddAVP and platelet transfusions.     OVERNIGHT EVENTS:  Agitated post-op, requiring sedation. However, after pain treated, he was weaned off IV sedation.    VITALS: [x] Reviewed    IMAGING/DATA: [x] Reviewed    IVF FLUIDS/MEDICATIONS: [x] Reviewed    ALLERGIES:  No Known Allergies    EXAMINATION:  Opens eyes to voice, follows commands on the right more briskly than left, PERRL, right arm very strong, left arm localizes antigravity, right leg spontaneous and antigravity, left leg withdraws in plane of the bed, decreased breath sides at bases but good air entry anteriorly, heart regular, abdomen soft, no limb edema

## 2017-08-08 NOTE — CONSULT NOTE ADULT - ASSESSMENT
Pt is a  73 y/o man w/ PMH of BPH, HLD, GERD, R sided headache with phys exam showing signs of RMCA stroke. CT showing hemorrhagic conversion in the temporal parietal occipital region. NIHSS 9. MRS 0. ICH 2.  - Recommend admit to NSCU for Q1hr neurochecks and hemicraniectomy watch based on symptomatic presentation  - Na goals 145-150  - Pain control  - Repeat CTH

## 2017-08-08 NOTE — ED PROVIDER NOTE - OBJECTIVE STATEMENT
Patient is 72 y M with PMH BPH, hld presenting with severe right sided headache that he woke up with at 2 AM this morning, nausea no vomiting. Last known well was when he went to bed at 11 AM. No facial droop, no vision changes, no numbness tingling or weakness, has not had this before. No fam hx of stroke. No trauma.    PMD:  ROS: Denies fever, palpitations, chills, recent sickness, HA, vision changes, cough, SOB, chest pain, abdominal pain, n/v/d/c, dysuria, hematuria, rash, new joint aches, sick contacts, and recent travel. Patient is 72 y M with PMH BPH, hld presenting with severe right sided headache that he woke up with at 2 AM this morning, nausea no vomiting. Last known well was when he went to bed at 11 AM. No facial droop, no vision changes, no numbness tingling or weakness, has not had this before. No fam hx of stroke. No trauma.    ROS: Denies fever, palpitations, chills, recent sickness, vision changes, cough, SOB, chest pain, abdominal pain, n/v/d/c, dysuria, hematuria, rash, new joint aches, sick contacts, and recent travel.

## 2017-08-08 NOTE — DISCHARGE NOTE ADULT - HOSPITAL COURSE
Mr. De La Cruz is a 73 y/o man w/ PMH of BPH, HLD, GERD, herniated cervical disc presenting with R sided headache. Last known normal was before he went to bed last night around 11pm. Pt awoke early in the morning at around 2 AM with a severe throbbing headache that behind his R eye that radiates to R occiput and down into his R neck. The pain has not spread, gotten more severe, or radiated anywhere since HA began. Pt denies any dysarthria, dysphagia, or visual changes. PT does endorse nausea, photophobia, dizziness, and lightheadedness.   Per collateral from pts girlfriend, pt called her when he got the HA and felt like he couldn't walk. When she arrived to pick him up, he was speaking fine but seemed to be confused, unable to walk, or see some things that were in front of him.    Son (John [phone], [phone]) & daughter (Shiela De Oliveira) who are both away in [place]. Girlfriend provided collateral information.       Neurological Review of Systems:  No difficulty with language.  No vision loss or double vision.  No dizziness, vertigo or new hearing loss.  No difficulty with speech or swallowing.    No numbness or tingling in the bilateral lower extremities.  No difficulty with balance.  No difficulty with ambulation.  The patient went to the OR on 8/8 - and had a craniotomy of the right with evacuation.  MRI brain showed stable right parietal occipital ICH with surrounding vasogenic edema and minimal IVH as well as evidence of few microhemorrhages predominantly located in cortical/subcortical locations and mild-to-moderate leukoaraiosis. MRA head and neck did not show any evidence of vascular malformation or significant intracranial or extracranial large vessel severe stenosis or occlusion. It was felt that the hemorrhage was likely secondary to amyloid angiopathy. Patient will need repeat MRI brain in 2-3 months. Patient had a TTE which showed an EF of 65% and was unremarkable. Patient was evaluated by speech and passed and is on a regular diet. Evaluated by PT/OT and deemed a candidate for acute rehab.

## 2017-08-08 NOTE — DISCHARGE NOTE ADULT - CARE PLAN
Principal Discharge DX:	ICH (intracerebral hemorrhage)  Goal:	Prevention of recurrence  Instructions for follow-up, activity and diet:	-Continue to take your medications as prescribed  -Follow up with Vascular Neurology - Dr. Liriano  -Follow up with Neurosurgery - Dr. Willoughby  -Repeat MRI brain in 2-3 months

## 2017-08-08 NOTE — DISCHARGE NOTE ADULT - CARE PROVIDERS DIRECT ADDRESSES
,[email or web address]

## 2017-08-08 NOTE — ED PROVIDER NOTE - PSH
After cataract  Right Eye IOL  IH (inguinal hernia)  Left Inguinal hernia repair 7/5/14  S/P angioplasty  2014 - [facility]

## 2017-08-09 LAB
CHOLEST SERPL-MCNC: 116 MG/DL — SIGNIFICANT CHANGE UP (ref 10–199)
GAS PNL BLDA: SIGNIFICANT CHANGE UP
GAS PNL BLDA: SIGNIFICANT CHANGE UP
HBA1C BLD-MCNC: 5.4 % — SIGNIFICANT CHANGE UP (ref 4–5.6)
HBA1C BLD-MCNC: 5.5 % — SIGNIFICANT CHANGE UP (ref 4–5.6)
HDLC SERPL-MCNC: 47 MG/DL — SIGNIFICANT CHANGE UP (ref 40–125)
LIPID PNL WITH DIRECT LDL SERPL: 54 MG/DL — SIGNIFICANT CHANGE UP
TOTAL CHOLESTEROL/HDL RATIO MEASUREMENT: 2.5 RATIO — LOW (ref 3.4–9.6)
TRIGL SERPL-MCNC: 74 MG/DL — SIGNIFICANT CHANGE UP (ref 10–149)

## 2017-08-09 PROCEDURE — 99291 CRITICAL CARE FIRST HOUR: CPT

## 2017-08-09 PROCEDURE — 70450 CT HEAD/BRAIN W/O DYE: CPT | Mod: 26

## 2017-08-09 PROCEDURE — 93306 TTE W/DOPPLER COMPLETE: CPT | Mod: 26

## 2017-08-09 PROCEDURE — 99223 1ST HOSP IP/OBS HIGH 75: CPT | Mod: GC

## 2017-08-09 RX ORDER — SODIUM CHLORIDE 9 MG/ML
250 INJECTION INTRAMUSCULAR; INTRAVENOUS; SUBCUTANEOUS ONCE
Qty: 0 | Refills: 0 | Status: COMPLETED | OUTPATIENT
Start: 2017-08-09 | End: 2017-08-09

## 2017-08-09 RX ORDER — ACETAMINOPHEN 500 MG
1000 TABLET ORAL ONCE
Qty: 0 | Refills: 0 | Status: COMPLETED | OUTPATIENT
Start: 2017-08-09 | End: 2017-08-09

## 2017-08-09 RX ORDER — ACETAMINOPHEN 500 MG
650 TABLET ORAL EVERY 6 HOURS
Qty: 0 | Refills: 0 | Status: DISCONTINUED | OUTPATIENT
Start: 2017-08-09 | End: 2017-08-14

## 2017-08-09 RX ORDER — LEVETIRACETAM 250 MG/1
500 TABLET, FILM COATED ORAL EVERY 12 HOURS
Qty: 0 | Refills: 0 | Status: DISCONTINUED | OUTPATIENT
Start: 2017-08-09 | End: 2017-08-10

## 2017-08-09 RX ORDER — HYDROMORPHONE HYDROCHLORIDE 2 MG/ML
0.5 INJECTION INTRAMUSCULAR; INTRAVENOUS; SUBCUTANEOUS ONCE
Qty: 0 | Refills: 0 | Status: DISCONTINUED | OUTPATIENT
Start: 2017-08-09 | End: 2017-08-09

## 2017-08-09 RX ORDER — OXYCODONE AND ACETAMINOPHEN 5; 325 MG/1; MG/1
1 TABLET ORAL EVERY 4 HOURS
Qty: 0 | Refills: 0 | Status: DISCONTINUED | OUTPATIENT
Start: 2017-08-09 | End: 2017-08-14

## 2017-08-09 RX ADMIN — Medication 400 MILLIGRAM(S): at 05:30

## 2017-08-09 RX ADMIN — SODIUM CHLORIDE 500 MILLILITER(S): 9 INJECTION INTRAMUSCULAR; INTRAVENOUS; SUBCUTANEOUS at 00:45

## 2017-08-09 RX ADMIN — OXYCODONE AND ACETAMINOPHEN 1 TABLET(S): 5; 325 TABLET ORAL at 15:40

## 2017-08-09 RX ADMIN — Medication 400 MILLIGRAM(S): at 18:10

## 2017-08-09 RX ADMIN — Medication 100 MILLIEQUIVALENT(S): at 00:00

## 2017-08-09 RX ADMIN — OXYCODONE AND ACETAMINOPHEN 1 TABLET(S): 5; 325 TABLET ORAL at 22:55

## 2017-08-09 RX ADMIN — HYDROMORPHONE HYDROCHLORIDE 0.5 MILLIGRAM(S): 2 INJECTION INTRAMUSCULAR; INTRAVENOUS; SUBCUTANEOUS at 08:05

## 2017-08-09 RX ADMIN — OXYCODONE AND ACETAMINOPHEN 1 TABLET(S): 5; 325 TABLET ORAL at 16:10

## 2017-08-09 RX ADMIN — Medication 1000 MILLIGRAM(S): at 05:45

## 2017-08-09 RX ADMIN — Medication 100 MILLIEQUIVALENT(S): at 00:47

## 2017-08-09 RX ADMIN — Medication 100 MILLIGRAM(S): at 15:40

## 2017-08-09 RX ADMIN — Medication 1000 MILLIGRAM(S): at 00:15

## 2017-08-09 RX ADMIN — Medication 100 MILLIEQUIVALENT(S): at 01:55

## 2017-08-09 RX ADMIN — LEVETIRACETAM 400 MILLIGRAM(S): 250 TABLET, FILM COATED ORAL at 21:42

## 2017-08-09 RX ADMIN — HYDROMORPHONE HYDROCHLORIDE 0.5 MILLIGRAM(S): 2 INJECTION INTRAMUSCULAR; INTRAVENOUS; SUBCUTANEOUS at 08:35

## 2017-08-09 RX ADMIN — SODIUM CHLORIDE 500 MILLILITER(S): 9 INJECTION INTRAMUSCULAR; INTRAVENOUS; SUBCUTANEOUS at 00:30

## 2017-08-09 RX ADMIN — OXYCODONE AND ACETAMINOPHEN 1 TABLET(S): 5; 325 TABLET ORAL at 23:25

## 2017-08-09 RX ADMIN — LEVETIRACETAM 400 MILLIGRAM(S): 250 TABLET, FILM COATED ORAL at 09:21

## 2017-08-09 RX ADMIN — Medication 1000 MILLIGRAM(S): at 18:40

## 2017-08-09 RX ADMIN — Medication 400 MILLIGRAM(S): at 00:00

## 2017-08-09 RX ADMIN — Medication 100 MILLIGRAM(S): at 21:42

## 2017-08-09 NOTE — OCCUPATIONAL THERAPY INITIAL EVALUATION ADULT - PLANNED THERAPY INTERVENTIONS, OT EVAL
bed mobility training/transfer training/ADL retraining/balance training/cognitive, visual perceptual

## 2017-08-09 NOTE — AIRWAY REMOVAL NOTE  ADULT & PEDS - ARTIFICAL AIRWAY REMOVAL COMMENTS
Written order for extubation verified. The patient was identified by full name and birth date compared to the identification band.  Present during the procedure was Cindy BOSE

## 2017-08-09 NOTE — PROGRESS NOTE ADULT - ASSESSMENT
R ICH, likely primary ischemic stroke with secondary hemorrhagic conversion    NEURO: neurochecks q1h, obtain head/neck vascular imaging when stable, MRI when stable, ASA held due to hemorrhagic conversion, stroke core measures, followup stroke consult, keppra for sz ppx   Activity: [] mobilize as tolerated [X] Bedrest [] PT [] OT [] PMNR    PULM: protecting airway, low threshold for intubation    CV: TTE, atorvastatin 80mg/d   SBP goal 100-150    RENAL: monitor chemistries, supplement electrolytes as indicated, place butler  Fluids: 2%@100ml/hr    GI: NPO for OR, NGT and TF after return  Diet: NPO  GI prophylaxis [] not indicated [] PPI [] other:  Bowel regimen [] colace [] senna [] other:    ENDO: send A1c, lipid panel  Goal euglycemia (-180)    HEME/ONC: monitor h/h, hold chemoppx given ICH. ddavp and 2u platelets due to daily ASA  VTE prophylaxis: [] SCDs [] chemoprophylaxis [] high risk of DVT/PE on admission due to:    ID: periop abx    MISC: PT/OT when stable    SOCIAL/FAMILY:   [] awaiting [X] updated at bedside [] family meeting    CODE STATUS:  [X] Full Code [] DNR [] DNI [] Palliative/Comfort Care    DISPOSITION:  [X] ICU [] Stroke Unit [] Floor [] EMU [] RCU [] PCU    [X] Patient is at high risk of neurologic deterioration/death due to: herniation, brain compression, respiratory failure      Time spent: R ICH, likely primary ischemic stroke with secondary hemorrhagic conversion, s/p decompressive craniotomy    NEURO: neurochecks q1h, obtain head/neck vascular imaging when stable, MRI when stable, ASA held due to hemorrhagic conversion and post op, stroke core measures, followup stroke consult, keppra for sz ppx to end 8/15  Activity: [X] mobilize as tolerated [] Bedrest [X] PT [X] OT [] PMNR    PULM: extubated,     CV: TTE, hold atorvastatin due to hemorrhage,    SBP goal 100-150    RENAL: monitor chemistries, supplement electrolytes as indicated, discontinue butler  Fluids: NS@100ml/hr, IVL when po intake sufficient     GI: advance diet as tolerated  Diet: NPO  GI prophylaxis [] not indicated [] PPI [] other:  Bowel regimen [] colace [] senna [] other:    ENDO: send A1c, lipid panel  Goal euglycemia (-180)    HEME/ONC: monitor h/h, hold chemoppx given hemorrhage and post op status.   VTE prophylaxis: [X] SCDs [] chemoprophylaxis [] high risk of DVT/PE on admission due to:    ID: periop abx    MISC: PT/OT when stable    SOCIAL/FAMILY:   [] awaiting [X] updated at bedside [] family meeting    CODE STATUS:  [X] Full Code [] DNR [] DNI [] Palliative/Comfort Care    DISPOSITION:  [X] ICU [] Stroke Unit [] Floor [] EMU [] RCU [] PCU    [X] Patient is at high risk of neurologic deterioration/death due to: herniation, brain compression, respiratory failure      Time spent: 55m

## 2017-08-09 NOTE — PROGRESS NOTE ADULT - SUBJECTIVE AND OBJECTIVE BOX
HPI:  72M w/ PMHx of BPH, HLD, GERD, herniated cervical disc who presented with R sided headache. Last known normal was before he went to bed on 8/7 at 11pm. Pt awoke early in the morning at around 2 AM with a severe throbbing headache that behind his R eye that radiated to R occiput and down into his R neck. Pt called his girlfriend and she reports that on arrival she noted that he was unable to walk, seemed confused and had difficulty seeing properly.  He was admitted to [facility] ED at 0500, CT findings suggestive of ischemic stroke with hemorrhagic conversion.    SURGERY: Craniotomy for evacuation of hematoma    PAST MEDICAL HISTORY: Herniated disc, cervical  BPH (benign prostatic hypertrophy)  GERD (gastroesophageal reflux disease)  Hyperlipidemia    PAST SURGICAL HISTORY: S/P angioplasty  IH (inguinal hernia)  After cataract    FAMILY HISTORY:  Family history of breast cancer    ALLERGIES: No Known Allergies    **************************************  **************************************    OVERNIGHT EVENTS: Underwent craniotomy for evacuation of R posterior parietal hemorrhage    ROS  Unobtainable due to mental status[] Negative []  Positives:    ADMISSION SCORES: NIHSS 19    ICU Vital Signs Last 24 Hrs  T(C): 37.1 (09 Aug 2017 03:00), Max: 37.1 (09 Aug 2017 03:00)  T(F): 98.7 (09 Aug 2017 03:00), Max: 98.7 (09 Aug 2017 03:00)  HR: 50 (09 Aug 2017 05:00) (50 - 95)  BP: 82/47 (09 Aug 2017 00:30) (82/47 - 156/86)  BP(mean): 58 (09 Aug 2017 00:30) (58 - 91)  ABP: 113/46 (09 Aug 2017 05:00) (86/41 - 139/61)  ABP(mean): 68 (09 Aug 2017 05:00) (56 - 88)  RR: 17 (09 Aug 2017 05:00) (12 - 18)  SpO2: 95% (09 Aug 2017 05:00) (95% - 100%)     08-08 @ 07:01  -  08-09 @ 06:47  --------------------------------------------------------  IN: 3334.2 mL / OUT: 1770 mL / NET: 1564.2 mL       Mode: AC/ CMV (Assist Control/ Continuous Mandatory Ventilation)  RR (machine): 12  TV (machine): 500  FiO2: 40  PEEP: 5  ITime: 1  MAP: 8  PIP: 12      DEVICES: [] Restraints [] SRINI/HMV []LD [] ET tube [] Trach [] Chest Tube [] A-line [] Brice [] NGT [] Rectal Tube [] EVD [] CVL  [] ICP/LiCOx    NEUROIMAGING:     MEDICATIONS:  levETIRAcetam  IVPB 500 milliGRAM(s) IV Intermittent every 12 hours  sodium chloride 0.9% with potassium chloride 20 mEq/L 1000 milliLiter(s) IV Continuous <Continuous>  acetaminophen    Suspension 650 milliGRAM(s) Oral every 6 hours PRN  acetaminophen    Suspension. 650 milliGRAM(s) Oral every 6 hours PRN  ondansetron Injectable 4 milliGRAM(s) IV Push every 6 hours PRN  docusate sodium 100 milliGRAM(s) Oral three times a day      PHYSICAL EXAM:  General: mild distress, appears uncomfortable  Neurological: lethargic, arousable to voice but difficult to maintain attention, cannot count 20-1, oriented to self only, pupils 2mm sluggishly reactive, midline gaze, reduced blink to threat from L, RUE/RLE at least 4+/5, LUE/LLE plegic. responds to noxious stimulus throughout. Visual and tactile hemineglect.   Lungs: Clear to auscultation  Heart: S1S2  Abdomen: Soft, nontender, BS present  Extremities: No edema      LABS:                        11.6   10.0  )-----------( 180      ( 08 Aug 2017 21:13 )             33.3    08-08    144  |  106  |  10  ----------------------------<  142<H>  3.4<L>   |  24  |  0.60    Ca    8.3<L>      08 Aug 2017 21:13  Phos  3.3     08-08  Mg     2.1     08-08    TPro  7.5  /  Alb  4.5  /  TBili  0.4  /  DBili  x   /  AST  22  /  ALT  21  /  AlkPhos  53  08-08   ABG - ( 09 Aug 2017 00:22 )  pH: 7.39  /  pCO2: 41    /  pO2: 121   / HCO3: 24    / Base Excess: -.2   /  SaO2: 99                   CARDIAC MARKERS ( 08 Aug 2017 05:46 )  x     / <0.01 ng/mL / 85 U/L / x     / x HPI:  72M w/ PMHx of BPH, HLD, GERD, herniated cervical disc who presented with R sided headache. Last known normal was before he went to bed on 8/7 at 11pm. Pt awoke early in the morning at around 2 AM with a severe throbbing headache that behind his R eye that radiated to R occiput and down into his R neck. Pt called his girlfriend and she reports that on arrival she noted that he was unable to walk, seemed confused and had difficulty seeing properly.  He was admitted to [facility] ED at 0500, CT findings suggestive of ischemic stroke with hemorrhagic conversion.    SURGERY: Craniotomy for evacuation of hematoma    PAST MEDICAL HISTORY: Herniated disc, cervical  BPH (benign prostatic hypertrophy)  GERD (gastroesophageal reflux disease)  Hyperlipidemia    PAST SURGICAL HISTORY: S/P angioplasty  IH (inguinal hernia)  After cataract    FAMILY HISTORY:  Family history of breast cancer    ALLERGIES: No Known Allergies    **************************************  **************************************    OVERNIGHT EVENTS: Underwent craniotomy for evacuation of R posterior parietal hemorrhage, extubated overnight, dropped pressure a few times, fluid responsive.    ROS  Unobtainable due to mental status[] Negative []  Positives:    ADMISSION SCORES: NIHSS 19    ICU Vital Signs Last 24 Hrs  T(C): 37.1 (09 Aug 2017 03:00), Max: 37.1 (09 Aug 2017 03:00)  T(F): 98.7 (09 Aug 2017 03:00), Max: 98.7 (09 Aug 2017 03:00)  HR: 50 (09 Aug 2017 05:00) (50 - 95)  BP: 82/47 (09 Aug 2017 00:30) (82/47 - 156/86)  BP(mean): 58 (09 Aug 2017 00:30) (58 - 91)  ABP: 113/46 (09 Aug 2017 05:00) (86/41 - 139/61)  ABP(mean): 68 (09 Aug 2017 05:00) (56 - 88)  RR: 17 (09 Aug 2017 05:00) (12 - 18)  SpO2: 95% (09 Aug 2017 05:00) (95% - 100%)     08-08 @ 07:01  -  08-09 @ 06:47  --------------------------------------------------------  IN: 3334.2 mL / OUT: 1770 mL / NET: 1564.2 mL       Mode: AC/ CMV (Assist Control/ Continuous Mandatory Ventilation)  RR (machine): 12  TV (machine): 500  FiO2: 40  PEEP: 5  ITime: 1  MAP: 8  PIP: 12      DEVICES: [] Restraints [X] SRINI/HMV []LD [] ET tube [] Trach [] Chest Tube [X] A-line [X] Brice [] NGT [] Rectal Tube [] EVD [] CVL  [] ICP/LiCOx    NEUROIMAGING: post op changes, improved mass effect, hemorrhage drained    MEDICATIONS:  levETIRAcetam  IVPB 500 milliGRAM(s) IV Intermittent every 12 hours  sodium chloride 0.9% with potassium chloride 20 mEq/L 1000 milliLiter(s) IV Continuous <Continuous>  acetaminophen    Suspension 650 milliGRAM(s) Oral every 6 hours PRN  acetaminophen    Suspension. 650 milliGRAM(s) Oral every 6 hours PRN  ondansetron Injectable 4 milliGRAM(s) IV Push every 6 hours PRN  docusate sodium 100 milliGRAM(s) Oral three times a day      PHYSICAL EXAM:  General: no distress  Neurological: awake, oriented x3, perrl, midline gaze, tracks bilaterally, w/L hemivisual neglect. responds to noxious stimulus throughout. Visual and tactile hemineglect.   Lungs: RUE 5/5, LUE proximal 3, distal 4+, LLE proximal 3 distal 4+. Cannot recognize own hand.   Lungs Clear to auscultation  Heart: S1S2  Abdomen: Soft, nontender, BS present  Extremities: No edema      LABS:                        11.6   10.0  )-----------( 180      ( 08 Aug 2017 21:13 )             33.3    08-08    144  |  106  |  10  ----------------------------<  142<H>  3.4<L>   |  24  |  0.60    Ca    8.3<L>      08 Aug 2017 21:13  Phos  3.3     08-08  Mg     2.1     08-08    TPro  7.5  /  Alb  4.5  /  TBili  0.4  /  DBili  x   /  AST  22  /  ALT  21  /  AlkPhos  53  08-08   ABG - ( 09 Aug 2017 00:22 )  pH: 7.39  /  pCO2: 41    /  pO2: 121   / HCO3: 24    / Base Excess: -.2   /  SaO2: 99                   CARDIAC MARKERS ( 08 Aug 2017 05:46 )  x     / <0.01 ng/mL / 85 U/L / x     / x

## 2017-08-09 NOTE — PROGRESS NOTE ADULT - SUBJECTIVE AND OBJECTIVE BOX
Patient seen and examined at bedside.    T(C): 36.7 (08-08-17 @ 23:00), Max: 36.7 (08-08-17 @ 05:01)  HR: 59 (08-09-17 @ 02:00) (52 - 95)  BP: 82/47 (08-09-17 @ 00:30) (82/47 - 183/98)  RR: 17 (08-09-17 @ 02:00) (12 - 18)  SpO2: 100% (08-09-17 @ 02:00) (96% - 100%)  Wt(kg): --    Exam: extubated, awake, oriented x3, FC, PERRL, EOMI, no facial  5/5 right side, LUE 3/5, LLE 4/5  SILT  no clonus

## 2017-08-09 NOTE — OCCUPATIONAL THERAPY INITIAL EVALUATION ADULT - LIVES WITH, PROFILE
alone/Pt lives in private home with 3 steps to enter, pt states gf stays with him on weekends, walk in shower in bathroom. Pt I in ADLs and ambulation prior to admission

## 2017-08-10 LAB
ANION GAP SERPL CALC-SCNC: 11 MMOL/L — SIGNIFICANT CHANGE UP (ref 5–17)
ANION GAP SERPL CALC-SCNC: 11 MMOL/L — SIGNIFICANT CHANGE UP (ref 5–17)
BUN SERPL-MCNC: 16 MG/DL — SIGNIFICANT CHANGE UP (ref 7–23)
BUN SERPL-MCNC: 16 MG/DL — SIGNIFICANT CHANGE UP (ref 7–23)
CALCIUM SERPL-MCNC: 8.3 MG/DL — LOW (ref 8.4–10.5)
CALCIUM SERPL-MCNC: 8.5 MG/DL — SIGNIFICANT CHANGE UP (ref 8.4–10.5)
CHLORIDE SERPL-SCNC: 105 MMOL/L — SIGNIFICANT CHANGE UP (ref 96–108)
CHLORIDE SERPL-SCNC: 106 MMOL/L — SIGNIFICANT CHANGE UP (ref 96–108)
CHOLEST SERPL-MCNC: 109 MG/DL — SIGNIFICANT CHANGE UP (ref 10–199)
CO2 SERPL-SCNC: 24 MMOL/L — SIGNIFICANT CHANGE UP (ref 22–31)
CO2 SERPL-SCNC: 24 MMOL/L — SIGNIFICANT CHANGE UP (ref 22–31)
CREAT SERPL-MCNC: 0.58 MG/DL — SIGNIFICANT CHANGE UP (ref 0.5–1.3)
CREAT SERPL-MCNC: 0.87 MG/DL — SIGNIFICANT CHANGE UP (ref 0.5–1.3)
GLUCOSE SERPL-MCNC: 109 MG/DL — HIGH (ref 70–99)
GLUCOSE SERPL-MCNC: 97 MG/DL — SIGNIFICANT CHANGE UP (ref 70–99)
HCT VFR BLD CALC: 33.9 % — LOW (ref 39–50)
HCT VFR BLD CALC: 35.7 % — LOW (ref 39–50)
HDLC SERPL-MCNC: 44 MG/DL — SIGNIFICANT CHANGE UP (ref 40–125)
HGB BLD-MCNC: 11.8 G/DL — LOW (ref 13–17)
HGB BLD-MCNC: 12.1 G/DL — LOW (ref 13–17)
LIPID PNL WITH DIRECT LDL SERPL: 33 MG/DL — SIGNIFICANT CHANGE UP
MAGNESIUM SERPL-MCNC: 2.1 MG/DL — SIGNIFICANT CHANGE UP (ref 1.6–2.6)
MAGNESIUM SERPL-MCNC: 2.2 MG/DL — SIGNIFICANT CHANGE UP (ref 1.6–2.6)
MCHC RBC-ENTMCNC: 34 GM/DL — SIGNIFICANT CHANGE UP (ref 32–36)
MCHC RBC-ENTMCNC: 34.4 PG — HIGH (ref 27–34)
MCHC RBC-ENTMCNC: 34.7 GM/DL — SIGNIFICANT CHANGE UP (ref 32–36)
MCHC RBC-ENTMCNC: 35.5 PG — HIGH (ref 27–34)
MCV RBC AUTO: 101 FL — HIGH (ref 80–100)
MCV RBC AUTO: 102 FL — HIGH (ref 80–100)
PHOSPHATE SERPL-MCNC: 2 MG/DL — LOW (ref 2.5–4.5)
PHOSPHATE SERPL-MCNC: 2.6 MG/DL — SIGNIFICANT CHANGE UP (ref 2.5–4.5)
PLATELET # BLD AUTO: 153 K/UL — SIGNIFICANT CHANGE UP (ref 150–400)
PLATELET # BLD AUTO: 181 K/UL — SIGNIFICANT CHANGE UP (ref 150–400)
POTASSIUM SERPL-MCNC: 4.2 MMOL/L — SIGNIFICANT CHANGE UP (ref 3.5–5.3)
POTASSIUM SERPL-MCNC: 4.2 MMOL/L — SIGNIFICANT CHANGE UP (ref 3.5–5.3)
POTASSIUM SERPL-SCNC: 4.2 MMOL/L — SIGNIFICANT CHANGE UP (ref 3.5–5.3)
POTASSIUM SERPL-SCNC: 4.2 MMOL/L — SIGNIFICANT CHANGE UP (ref 3.5–5.3)
RBC # BLD: 3.31 M/UL — LOW (ref 4.2–5.8)
RBC # BLD: 3.53 M/UL — LOW (ref 4.2–5.8)
RBC # FLD: 11.6 % — SIGNIFICANT CHANGE UP (ref 10.3–14.5)
RBC # FLD: 11.6 % — SIGNIFICANT CHANGE UP (ref 10.3–14.5)
SODIUM SERPL-SCNC: 140 MMOL/L — SIGNIFICANT CHANGE UP (ref 135–145)
SODIUM SERPL-SCNC: 141 MMOL/L — SIGNIFICANT CHANGE UP (ref 135–145)
TOTAL CHOLESTEROL/HDL RATIO MEASUREMENT: 2.5 RATIO — LOW (ref 3.4–9.6)
TRIGL SERPL-MCNC: 160 MG/DL — HIGH (ref 10–149)
WBC # BLD: 8.6 K/UL — SIGNIFICANT CHANGE UP (ref 3.8–10.5)
WBC # BLD: 9.7 K/UL — SIGNIFICANT CHANGE UP (ref 3.8–10.5)
WBC # FLD AUTO: 8.6 K/UL — SIGNIFICANT CHANGE UP (ref 3.8–10.5)
WBC # FLD AUTO: 9.7 K/UL — SIGNIFICANT CHANGE UP (ref 3.8–10.5)

## 2017-08-10 PROCEDURE — 99291 CRITICAL CARE FIRST HOUR: CPT

## 2017-08-10 RX ORDER — HYDROMORPHONE HYDROCHLORIDE 2 MG/ML
0.5 INJECTION INTRAMUSCULAR; INTRAVENOUS; SUBCUTANEOUS ONCE
Qty: 0 | Refills: 0 | Status: DISCONTINUED | OUTPATIENT
Start: 2017-08-10 | End: 2017-08-10

## 2017-08-10 RX ORDER — LEVETIRACETAM 250 MG/1
500 TABLET, FILM COATED ORAL
Qty: 0 | Refills: 0 | Status: DISCONTINUED | OUTPATIENT
Start: 2017-08-10 | End: 2017-08-10

## 2017-08-10 RX ORDER — ACETAMINOPHEN 500 MG
1000 TABLET ORAL ONCE
Qty: 0 | Refills: 0 | Status: COMPLETED | OUTPATIENT
Start: 2017-08-10 | End: 2017-08-10

## 2017-08-10 RX ORDER — ENOXAPARIN SODIUM 100 MG/ML
40 INJECTION SUBCUTANEOUS
Qty: 0 | Refills: 0 | Status: DISCONTINUED | OUTPATIENT
Start: 2017-08-10 | End: 2017-08-14

## 2017-08-10 RX ORDER — POLYETHYLENE GLYCOL 3350 17 G/17G
17 POWDER, FOR SOLUTION ORAL DAILY
Qty: 0 | Refills: 0 | Status: DISCONTINUED | OUTPATIENT
Start: 2017-08-10 | End: 2017-08-14

## 2017-08-10 RX ORDER — ATORVASTATIN CALCIUM 80 MG/1
40 TABLET, FILM COATED ORAL AT BEDTIME
Qty: 0 | Refills: 0 | Status: DISCONTINUED | OUTPATIENT
Start: 2017-08-10 | End: 2017-08-13

## 2017-08-10 RX ORDER — ACETAMINOPHEN 500 MG
650 TABLET ORAL ONCE
Qty: 0 | Refills: 0 | Status: DISCONTINUED | OUTPATIENT
Start: 2017-08-10 | End: 2017-08-10

## 2017-08-10 RX ORDER — FENTANYL CITRATE 50 UG/ML
12.5 INJECTION INTRAVENOUS ONCE
Qty: 0 | Refills: 0 | Status: DISCONTINUED | OUTPATIENT
Start: 2017-08-10 | End: 2017-08-10

## 2017-08-10 RX ADMIN — POLYETHYLENE GLYCOL 3350 17 GRAM(S): 17 POWDER, FOR SOLUTION ORAL at 14:23

## 2017-08-10 RX ADMIN — Medication 1000 MILLIGRAM(S): at 05:45

## 2017-08-10 RX ADMIN — ENOXAPARIN SODIUM 40 MILLIGRAM(S): 100 INJECTION SUBCUTANEOUS at 17:09

## 2017-08-10 RX ADMIN — FENTANYL CITRATE 12.5 MICROGRAM(S): 50 INJECTION INTRAVENOUS at 02:40

## 2017-08-10 RX ADMIN — Medication 400 MILLIGRAM(S): at 05:15

## 2017-08-10 RX ADMIN — LEVETIRACETAM 500 MILLIGRAM(S): 250 TABLET, FILM COATED ORAL at 06:09

## 2017-08-10 RX ADMIN — FENTANYL CITRATE 12.5 MICROGRAM(S): 50 INJECTION INTRAVENOUS at 02:10

## 2017-08-10 RX ADMIN — HYDROMORPHONE HYDROCHLORIDE 0.5 MILLIGRAM(S): 2 INJECTION INTRAMUSCULAR; INTRAVENOUS; SUBCUTANEOUS at 08:40

## 2017-08-10 RX ADMIN — ONDANSETRON 4 MILLIGRAM(S): 8 TABLET, FILM COATED ORAL at 03:15

## 2017-08-10 RX ADMIN — Medication 100 MILLIGRAM(S): at 06:09

## 2017-08-10 RX ADMIN — ATORVASTATIN CALCIUM 40 MILLIGRAM(S): 80 TABLET, FILM COATED ORAL at 21:05

## 2017-08-10 RX ADMIN — Medication 63.75 MILLIMOLE(S): at 05:44

## 2017-08-10 RX ADMIN — Medication 100 MILLIGRAM(S): at 14:23

## 2017-08-10 RX ADMIN — Medication 1000 MILLIGRAM(S): at 17:00

## 2017-08-10 RX ADMIN — Medication 400 MILLIGRAM(S): at 16:30

## 2017-08-10 RX ADMIN — HYDROMORPHONE HYDROCHLORIDE 0.5 MILLIGRAM(S): 2 INJECTION INTRAMUSCULAR; INTRAVENOUS; SUBCUTANEOUS at 08:10

## 2017-08-10 RX ADMIN — Medication 100 MILLIGRAM(S): at 21:05

## 2017-08-10 NOTE — PROGRESS NOTE ADULT - SUBJECTIVE AND OBJECTIVE BOX
THE PATIENT WAS SEEN AND EXAMINED BY ME WITH THE HOUSESTAFF AND STROKE TEAM DURING MORNING ROUNDS.   HPI:  HPI:   73 y/o man w/ PMH of BPH, HLD, GERD, herniated cervical disc presenting with R sided headache. Last known normal was before he went to bed the night prior to of admission.  Pt awoke early in the morning at around 2 AM with a severe throbbing headache that behind his R eye that radiates to R occiput and down into his R neck. The pain has not spread, gotten more severe, or radiated anywhere since HA began. Pt denies any dysarthria, dysphagia, or visual changes. PT does endorse nausea, photophobia, dizziness, and lightheadedness.   Per collateral from pts girlfriend, pt called her when he got the HA and felt like he couldn't walk. When she arrived to pick him up, he was speaking fine but seemed to be confused, unable to walk, or see some things that were in front of him.      SUBJECTIVE: No events overnight.  No new neurologic complaints.      acetaminophen    Suspension. 650 milliGRAM(s) Oral every 6 hours PRN  ondansetron Injectable 4 milliGRAM(s) IV Push every 6 hours PRN  docusate sodium 100 milliGRAM(s) Oral three times a day  oxyCODONE    5 mG/acetaminophen 325 mG 1 Tablet(s) Oral every 4 hours PRN  acetaminophen   Tablet 650 milliGRAM(s) Oral every 6 hours PRN  atorvastatin 40 milliGRAM(s) Oral at bedtime  polyethylene glycol 3350 17 Gram(s) Oral daily  enoxaparin Injectable 40 milliGRAM(s) SubCutaneous <User Schedule>      PHYSICAL EXAM:   Vital Signs Last 24 Hrs  T(C): 36.8 (10 Aug 2017 11:00), Max: 37.1 (09 Aug 2017 19:00)  T(F): 98.3 (10 Aug 2017 11:00), Max: 98.8 (09 Aug 2017 19:00)  HR: 61 (10 Aug 2017 11:00) (48 - 78)  BP: 104/91 (10 Aug 2017 11:00) (92/54 - 116/82)  BP(mean): 97 (10 Aug 2017 11:00) (62 - 97)  RR: 14 (10 Aug 2017 11:00) (12 - 18)  SpO2: 97% (10 Aug 2017 11:00) (91% - 100%)    General: No acute distress  HEENT: EOM intact, visual fields full  Abdomen: Soft, nontender, nondistended   Extremities: No edema    NEUROLOGICAL EXAM:  Mental status: Awake, alert, oriented x3, able to generate short sentences, left motor neglect to dual stimulation   Cranial Nerves: left nasolabial flattening, LHH, no nystagmus, no dysarthria, .  Motor exam: Normal tone, no drift, RUE 5/5, RLE 5/5, left hemiparesis LUE 3/5, LLE 4/5,   Sensation:  Left side motor neglect   Coordination/ Gait: No dysmetria,     LABS:                        11.8   9.7   )-----------( 153      ( 10 Aug 2017 04:20 )             33.9    08-10    140  |  105  |  16  ----------------------------<  97  4.2   |  24  |  0.58    Ca    8.3<L>      10 Aug 2017 04:20  Phos  2.0     08-10  Mg     2.2     08-10      Hemoglobin A1C, Whole Blood: 5.5 % (08-09 @ 22:36)  Hemoglobin A1C, Whole Blood: 5.4 % (08-09 @ 10:42)      IMAGING: Reviewed by me.     Head CT (08/08) Severely motion degraded CT scan. There is limited visualization of a large acute intraparenchymal hemorrhage centered in the right temporal-occipital  region that measures approximately 6 x 3.5 x 4 cm. Mild surrounding vasogenic edema and mass effect. No evidence of brain herniation or hydrocephalus hydrocephalus. The hemorrhage may be due to hypertensive bleed or hemorrhagic transformation of an infarct.   head CT (08/09) Interval right parietal craniotomy and evacuation of right posterior parietal, occipital, and temporal parenchymal hemorrhage. Scattered residual parenchymal hemorrhage in the subjacent surgical bed. Residual edema surrounding the surgical bed. Resolution of leftward midline shift. No effacement of the basal cisterns or hydrocephalus. THE PATIENT WAS SEEN AND EXAMINED BY ME WITH THE HOUSESTAFF AND STROKE TEAM DURING MORNING ROUNDS.     HPI:   73 y/o man w/ PMH of BPH, HLD, GERD, herniated cervical disc presenting with R sided headache. Last known normal was before he went to bed the night prior to of admission.  Pt awoke early in the morning at around 2 AM with a severe throbbing headache that behind his R eye that radiates to R occiput and down into his R neck. The pain has not spread, gotten more severe, or radiated anywhere since HA began. Pt denies any dysarthria, dysphagia, or visual changes. PT does endorse nausea, photophobia, dizziness, and lightheadedness.   Per collateral from pts girlfriend, pt called her when he got the HA and felt like he couldn't walk. When she arrived to pick him up, he was speaking fine but seemed to be confused, unable to walk, or see some things that were in front of him.      SUBJECTIVE: No events overnight.  No new neurologic complaints.      acetaminophen    Suspension. 650 milliGRAM(s) Oral every 6 hours PRN  ondansetron Injectable 4 milliGRAM(s) IV Push every 6 hours PRN  docusate sodium 100 milliGRAM(s) Oral three times a day  oxyCODONE    5 mG/acetaminophen 325 mG 1 Tablet(s) Oral every 4 hours PRN  acetaminophen   Tablet 650 milliGRAM(s) Oral every 6 hours PRN  atorvastatin 40 milliGRAM(s) Oral at bedtime  polyethylene glycol 3350 17 Gram(s) Oral daily  enoxaparin Injectable 40 milliGRAM(s) SubCutaneous <User Schedule>      PHYSICAL EXAM:   Vital Signs Last 24 Hrs  T(C): 36.8 (10 Aug 2017 11:00), Max: 37.1 (09 Aug 2017 19:00)  T(F): 98.3 (10 Aug 2017 11:00), Max: 98.8 (09 Aug 2017 19:00)  HR: 61 (10 Aug 2017 11:00) (48 - 78)  BP: 104/91 (10 Aug 2017 11:00) (92/54 - 116/82)  BP(mean): 97 (10 Aug 2017 11:00) (62 - 97)  RR: 14 (10 Aug 2017 11:00) (12 - 18)  SpO2: 97% (10 Aug 2017 11:00) (91% - 100%)    General: No acute distress  HEENT: EOM intact, visual fields full  Abdomen: Soft, nontender, nondistended   Extremities: No edema    NEUROLOGICAL EXAM:  Mental status: Awake, alert, oriented x3, able to generate short sentences, left motor neglect to dual stimulation   Cranial Nerves: left nasolabial flattening, LHH, no nystagmus, no dysarthria, .  Motor exam: Normal tone, no drift, RUE 5/5, RLE 5/5, left hemiparesis LUE 3/5, LLE 4/5,   Sensation:  Left side motor neglect   Coordination/ Gait: No dysmetria,     LABS:                        11.8   9.7   )-----------( 153      ( 10 Aug 2017 04:20 )             33.9    08-10    140  |  105  |  16  ----------------------------<  97  4.2   |  24  |  0.58    Ca    8.3<L>      10 Aug 2017 04:20  Phos  2.0     08-10  Mg     2.2     08-10      Hemoglobin A1C, Whole Blood: 5.5 % (08-09 @ 22:36)  Hemoglobin A1C, Whole Blood: 5.4 % (08-09 @ 10:42)      IMAGING: Reviewed by me.     Head CT (08/08) Severely motion degraded CT scan. There is limited visualization of a large acute intraparenchymal hemorrhage centered in the right temporal-occipital  region that measures approximately 6 x 3.5 x 4 cm. Mild surrounding vasogenic edema and mass effect. No evidence of brain herniation or hydrocephalus hydrocephalus. The hemorrhage may be due to hypertensive bleed or hemorrhagic transformation of an infarct.   head CT (08/09) Interval right parietal craniotomy and evacuation of right posterior parietal, occipital, and temporal parenchymal hemorrhage. Scattered residual parenchymal hemorrhage in the subjacent surgical bed. Residual edema surrounding the surgical bed. Resolution of leftward midline shift. No effacement of the basal cisterns or hydrocephalus.

## 2017-08-10 NOTE — PHYSICAL THERAPY INITIAL EVALUATION ADULT - ADDITIONAL COMMENTS
Pt lives in a private house with 3 steps to enter, L handrail ascending; main floor set up. Pt states he owns a rolling walker, quad cane, and a straight cane from previous back surgery

## 2017-08-10 NOTE — PROGRESS NOTE ADULT - SUBJECTIVE AND OBJECTIVE BOX
HPI:  72M w/ PMHx of BPH, HLD, GERD, herniated cervical disc who presented with R sided headache. Last known normal was before he went to bed on 8/7 at 11pm. Pt awoke early in the morning at around 2 AM with a severe throbbing headache that behind his R eye that radiated to R occiput and down into his R neck. Pt called his girlfriend and she reports that on arrival she noted that he was unable to walk, seemed confused and had difficulty seeing properly.  He was admitted to [facility] ED at 0500, CT findings suggestive of ischemic stroke with hemorrhagic conversion. Underwent emergent craniotomy for evacuation of hematoma and subsequently admitted to the [facility].      SURGERY: Craniotomy for evacuation of hematoma    PAST MEDICAL HISTORY: Herniated disc, cervical  BPH (benign prostatic hypertrophy)  GERD (gastroesophageal reflux disease)  Hyperlipidemia    PAST SURGICAL HISTORY: S/P angioplasty  IH (inguinal hernia)  After cataract    FAMILY HISTORY:  Family history of breast cancer    ALLERGIES: No Known Allergies    **************************************  **************************************    OVERNIGHT EVENTS: Episode of confusion -- redirectable    ROS  Unobtainable due to mental status[] Negative []  Positives:    ADMISSION SCORES: GCS: HH: MF: NIHSS: RASS: CAM-ICU: ICP:    ICU Vital Signs Last 24 Hrs  T(C): 36.6 (10 Aug 2017 03:00), Max: 37.1 (09 Aug 2017 19:00)  T(F): 97.9 (10 Aug 2017 03:00), Max: 98.8 (09 Aug 2017 19:00)  HR: 78 (10 Aug 2017 06:00) (46 - 78)  BP: 109/59 (10 Aug 2017 06:00) (92/54 - 116/67)  BP(mean): 73 (10 Aug 2017 06:00) (63 - 96)  ABP: 98/43 (09 Aug 2017 09:00) (98/43 - 113/49)  ABP(mean): 61 (09 Aug 2017 09:00) (61 - 70)  RR: 16 (10 Aug 2017 06:00) (11 - 22)  SpO2: 95% (10 Aug 2017 06:00) (93% - 100%)     08-08 @ 07:01  -  08-09 @ 07:00  --------------------------------------------------------  IN: 3434.2 mL / OUT: 2015 mL / NET: 1419.2 mL    08-09 @ 07:01  -  08-10 @ 06:50  --------------------------------------------------------  IN: 2202.5 mL / OUT: 1265 mL / NET: 937.5 mL           DEVICES: [] Restraints [X] SRINI/HMV []LD [] ET tube [] Trach [] Chest Tube [X] A-line [X] Brice [] NGT [] Rectal Tube [] EVD [] CVL  [] ICP/LiCOx    NEUROIMAGING:     EEG REPORT:     MEDICATIONS:  acetaminophen    Suspension. 650 milliGRAM(s) Oral every 6 hours PRN  ondansetron Injectable 4 milliGRAM(s) IV Push every 6 hours PRN  docusate sodium 100 milliGRAM(s) Oral three times a day  oxyCODONE    5 mG/acetaminophen 325 mG 1 Tablet(s) Oral every 4 hours PRN  acetaminophen   Tablet 650 milliGRAM(s) Oral every 6 hours PRN  levETIRAcetam 500 milliGRAM(s) Oral two times a day      PHYSICAL EXAM:  General: no distress  Neurological: awake, oriented x3, perrl, midline gaze, tracks bilaterally, w/L hemivisual neglect. responds to noxious stimulus throughout. Visual and tactile hemineglect.   Lungs: RUE 5/5, LUE proximal 3, distal 4+, LLE proximal 3 distal 4+. Cannot recognize own hand.   Lungs Clear to auscultation  Heart: S1S2  Abdomen: Soft, nontender, BS present  Extremities: No edema      LABS:                        11.8   9.7   )-----------( 153      ( 10 Aug 2017 04:20 )             33.9    08-10    140  |  105  |  16  ----------------------------<  97  4.2   |  24  |  0.58    Ca    8.3<L>      10 Aug 2017 04:20  Phos  2.0     08-10  Mg     2.2     08-10     ABG - ( 09 Aug 2017 06:51 )  pH: 7.41  /  pCO2: 39    /  pO2: 94    / HCO3: 24    / Base Excess: .2    /  SaO2: 98               Lipids and LFTs 08-09 @ 22:36  109  33  44  2.5  160  --  --  --  --  --  --  --  --  Lipids and LFTs 08-09 @ 12:54  116  54  47  2.5  74  --  --  --  --  --  --  --  --   Hemoglobin A1C, Whole Blood: 5.5 % (08-09 @ 22:36)  Hemoglobin A1C, Whole Blood: 5.4 % (08-09 @ 10:42) HPI:  72M w/ PMHx of BPH, HLD, GERD, herniated cervical disc who presented with R sided headache. Last known normal was before he went to bed on 8/7 at 11pm. Pt awoke early in the morning at around 2 AM with a severe throbbing headache that behind his R eye that radiated to R occiput and down into his R neck. Pt called his girlfriend and she reports that on arrival she noted that he was unable to walk, seemed confused and had difficulty seeing properly.  He was admitted to [facility] ED at 0500, CT findings suggestive of ischemic stroke with hemorrhagic conversion. Underwent emergent craniotomy for evacuation of hematoma and subsequently admitted to the [facility].      SURGERY: Craniotomy for evacuation of hematoma    PAST MEDICAL HISTORY: Herniated disc, cervical  BPH (benign prostatic hypertrophy)  GERD (gastroesophageal reflux disease)  Hyperlipidemia    PAST SURGICAL HISTORY: S/P angioplasty  IH (inguinal hernia)  After cataract    FAMILY HISTORY:  Family history of breast cancer    ALLERGIES: No Known Allergies    **************************************  **************************************    OVERNIGHT EVENTS: Episode of confusion -- redirectable    ROS  Unobtainable due to mental status[] Negative []  Positives:    ADMISSION SCORES: GCS: HH: MF: NIHSS: RASS: CAM-ICU: ICP:    ICU Vital Signs Last 24 Hrs  T(C): 36.6 (10 Aug 2017 03:00), Max: 37.1 (09 Aug 2017 19:00)  T(F): 97.9 (10 Aug 2017 03:00), Max: 98.8 (09 Aug 2017 19:00)  HR: 78 (10 Aug 2017 06:00) (46 - 78)  BP: 109/59 (10 Aug 2017 06:00) (92/54 - 116/67)  BP(mean): 73 (10 Aug 2017 06:00) (63 - 96)  ABP: 98/43 (09 Aug 2017 09:00) (98/43 - 113/49)  ABP(mean): 61 (09 Aug 2017 09:00) (61 - 70)  RR: 16 (10 Aug 2017 06:00) (11 - 22)  SpO2: 95% (10 Aug 2017 06:00) (93% - 100%)     08-08 @ 07:01  -  08-09 @ 07:00  --------------------------------------------------------  IN: 3434.2 mL / OUT: 2015 mL / NET: 1419.2 mL    08-09 @ 07:01  -  08-10 @ 06:50  --------------------------------------------------------  IN: 2202.5 mL / OUT: 1265 mL / NET: 937.5 mL           DEVICES: [] Restraints [X] SRINI/HMV []LD [] ET tube [] Trach [] Chest Tube [X] A-line [X] Brice [] NGT [] Rectal Tube [] EVD [] CVL  [] ICP/LiCOx    NEUROIMAGING:     EEG REPORT:     MEDICATIONS:  acetaminophen    Suspension. 650 milliGRAM(s) Oral every 6 hours PRN  ondansetron Injectable 4 milliGRAM(s) IV Push every 6 hours PRN  docusate sodium 100 milliGRAM(s) Oral three times a day  oxyCODONE    5 mG/acetaminophen 325 mG 1 Tablet(s) Oral every 4 hours PRN  acetaminophen   Tablet 650 milliGRAM(s) Oral every 6 hours PRN  levETIRAcetam 500 milliGRAM(s) Oral two times a day      PHYSICAL EXAM:  General: no distress  Neurological: awake, oriented x3, perrl, midline gaze, tracks bilaterally, L hemivisual neglect. responds to noxious stimulus throughout. mild L facial droop  Lungs: RUE 5/5, LUE proximal 4, distal 4+, LLE proximal 4 distal 5.   Lungs Clear to auscultation  Heart: S1S2  Abdomen: Soft, nontender, BS present  Extremities: No edema      LABS:                        11.8   9.7   )-----------( 153      ( 10 Aug 2017 04:20 )             33.9    08-10    140  |  105  |  16  ----------------------------<  97  4.2   |  24  |  0.58    Ca    8.3<L>      10 Aug 2017 04:20  Phos  2.0     08-10  Mg     2.2     08-10     ABG - ( 09 Aug 2017 06:51 )  pH: 7.41  /  pCO2: 39    /  pO2: 94    / HCO3: 24    / Base Excess: .2    /  SaO2: 98               Lipids and LFTs 08-09 @ 22:36  109  33  44  2.5  160  --  --  --  --  --  --  --  --  Lipids and LFTs 08-09 @ 12:54  116  54  47  2.5  74  --  --  --  --  --  --  --  --   Hemoglobin A1C, Whole Blood: 5.5 % (08-09 @ 22:36)  Hemoglobin A1C, Whole Blood: 5.4 % (08-09 @ 10:42)

## 2017-08-10 NOTE — PHYSICAL THERAPY INITIAL EVALUATION ADULT - PERTINENT HX OF CURRENT PROBLEM, REHAB EVAL
72 M w/ PMH of BPH, HLD, GERD, herniated cervical disc presenting with R sided headache. LKN was before he went to bed last night around 11pm. Pt awoke around 2AM w/ a severe throbbing headache behind his R eye radiating to R occiput and down into his R neck. Per pts girlfriend, pt called her when he got the HA and felt like he couldn't walk. When she arrived to pick him up, he was speaking fine but seemed to be confused, unable to walk, or see some things that were in front of him.

## 2017-08-10 NOTE — H&P ADULT - NSHPLABSRESULTS_GEN_ALL_CORE
LABS:                        11.8   9.7   )-----------( 153      ( 10 Aug 2017 04:20 )             33.9     08-10    140  |  105  |  16  ----------------------------<  97  4.2   |  24  |  0.58    Ca    8.3<L>      10 Aug 2017 04:20  Phos  2.0     08-10  Mg     2.2     08-10      PT/INR - ( 08 Aug 2017 12:18 )   PT: 11.9 sec;   INR: 1.09 ratio         PTT - ( 08 Aug 2017 12:18 )  PTT:30.3 sec      RADIOLOGY & ADDITIONAL STUDIES:\  IMPRESSION:   Severely motion degraded CT scan.  There is limited visualization of a   large acute intraparenchymal hemorrhage centered in the right   temporal-occipital region that measures approximately 6 x 3.5x 4 cm.    Mild surrounding vasogenic edema and mass effect.  No evidence of brain   herniation or hydrocephalus hydrocephalus.  The hemorrhage may be due to   hypertensive bleed or hemorrhagic transformation of an infarct.    Short-term interval follow-up head CT scan in approximately four hours is   recommended to exclude interval enlargement of the hematoma.  CTA can be   performed when the patient is able to cooperate.      ELEONORA GRAVES M.D.,ATTENDING RADIOLOGIST  This document has been electronically signed. Aug  8 2017  6:30AM

## 2017-08-10 NOTE — PHYSICAL THERAPY INITIAL EVALUATION ADULT - CRITERIA FOR SKILLED THERAPEUTIC INTERVENTIONS
impairments found/risk reduction/prevention/anticipated discharge recommendation/functional limitations in following categories/rehab potential

## 2017-08-10 NOTE — CONSULT NOTE ADULT - SUBJECTIVE AND OBJECTIVE BOX
Patient is a 72y old  Male who presents with a chief complaint of Headache (10 Aug 2017 09:20)     71 y/o man w/ PMH of BPH, HLD, GERD, herniated cervical disc presenting with R sided headache. Last known normal was before he went to bed the night prior to of admission.  Pt awoke early in the morning at around 2 AM with a severe throbbing headache that behind his R eye that radiates to R occiput and down into his R neck. The pain has not spread, gotten more severe, or radiated anywhere since HA began. Pt denies any dysarthria, dysphagia, or visual changes. PT does endorse nausea, photophobia, dizziness, and lightheadedness.   .  INTERVAL HPI/OVERNIGHT EVENTS:  s/p evacuation    MEDICATIONS  (STANDING):  docusate sodium 100 milliGRAM(s) Oral three times a day  atorvastatin 40 milliGRAM(s) Oral at bedtime  polyethylene glycol 3350 17 Gram(s) Oral daily  enoxaparin Injectable 40 milliGRAM(s) SubCutaneous <User Schedule>                Allergies    No Known Allergies    Intolerances        REVIEW OF SYSTEMS:  CARDIOVASCULAR: No chest pain, palpitations, dizziness, or leg swelling; no shortness of breath     RESPIRATORY: No cough, wheezing, chills or hemoptysis; No shortness of breath    GASTROINTESTINAL: No abdominal or epigastric pain. No nausea, vomiting, or hematemesis; No diarrhea or constipation. No melena or hematochezia.    NEUROLOGICAL: No headaches, memory loss, loss of strength, numbness      PHYSICAL EXAM:  Vital Signs Last 24 Hrs  T(C): 37 (10 Aug 2017 15:00), Max: 37.1 (09 Aug 2017 19:00)  T(F): 98.6 (10 Aug 2017 15:00), Max: 98.8 (09 Aug 2017 19:00)  HR: 55 (10 Aug 2017 15:00) (48 - 78)  BP: 105/54 (10 Aug 2017 15:00) (92/54 - 116/82)  BP(mean): 70 (10 Aug 2017 15:00) (62 - 97)  RR: 17 (10 Aug 2017 15:00) (12 - 18)  SpO2: 96% (10 Aug 2017 15:00) (91% - 98%)    GENERAL: NAD, well-groomed, well-developed  HEAD:  Atraumatic, Normocephalic    Head CT (08/08) Severely motion degraded CT scan. There is limited visualization of a large acute intraparenchymal hemorrhage centered in the right temporal-occipital  region that measures approximately 6 x 3.5 x 4 cm. Mild surrounding vasogenic edema and mass effect. No evidence of brain herniation or hydrocephalus hydrocephalus. The hemorrhage may be due to hypertensive bleed or hemorrhagic transformation of an infarct.   head CT (08/09) Interval right parietal craniotomy and evacuation of right posterior parietal, occipital, and temporal parenchymal hemorrhage. Scattered residual parenchymal hemorrhage in the subjacent surgical bed. Residual edema surrounding the surgical bed. Resolution of leftward midline shift. No effacement of the basal cisterns or hydrocephalus.EYES: EOMI, PERRLA, conjunctiva and sclera clear  NECK: Supple, No JVD, Normal thyroid  NERVOUS SYSTEM:  left hemipareisis   CHEST/LUNG: Clear to auscultation bilaterally; No rales, rhonchi, wheezing, or rubs  HEART: S1S2 regular, without murmur, rub nor gallop  ABDOMEN: Soft, Nontender, Nondistended; Bowel sounds present  EXTREMITIES:  2+ Peripheral Pulses, No clubbing, cyanosis, or edema      LABS:                        11.8   9.7   )-----------( 153      ( 10 Aug 2017 04:20 )             33.9     10 Aug 2017 04:20    140    |  105    |  16     ----------------------------<  97     4.2     |  24     |  0.58     Ca    8.3        10 Aug 2017 04:20  Phos  2.0       10 Aug 2017 04:20  Mg     2.2       10 Aug 2017 04:20        ASSESSMENT: 72 years old man with vascular risk factors of age and hyperlipidemia is evaluated at [facility] for acute onset of headache and left sided weakness since 8/8/17. CT brain on admission showed right tempo-parieto-occipital ICH with surrounding vasogenic edema leading to mass effect and bring compression. He subsequently underwent craniectomy and hematoma evacuation. Impression:  Nontraumatic right hemispheric cortically located ICH. Right tempo-parieto-occipital ICH - likely etiology being possible cerebral amyloid angiopathy versus hemorrhagic transformation of ischemic stroke, favoring the former. Associated vasogenic edema leading to mass effect and brain compression      assessment:      Plan:   continue post op care.  MRI, MRA when stable.   ? Antiplatelet therapy    Care Discussed with Consultants/Other Providers:  Tyler NAYLOR

## 2017-08-10 NOTE — PHYSICAL THERAPY INITIAL EVALUATION ADULT - PRECAUTIONS/LIMITATIONS, REHAB EVAL
+hearing impaired L greater than R ear; HEAD CT 8/8: Right temporo-occipital intraparenchymal hematoma with associated leftward midline shift. HEAD CT 8/8: Severely motion degraded CT scan.  There is limited visualization of a large acute intraparenchymal hemorrhage centered in the right temporal-occipital region that measures approximately 6 x 3.5 x 4 cm. Mild surrounding vasogenic edema and mass effect.  No evidence of brain herniation or hydrocephalus hydrocephalus.  The hemorrhage may be due to hypertensive bleed or hemorrhagic transformation of an infarct. HEAD CT 8/9:  Interval right parietal craniotomy and evacuation of right posterior  parietal, occipital, and temporal parenchymal hemorrhage. Scattered residual parenchymal hemorrhage in the subjacent surgical bed. Residual edema surrounding the surgical bed. Resolution of leftward midline shift. No effacement of the basal cisterns or hydrocephalus./fall precautions

## 2017-08-10 NOTE — PROGRESS NOTE ADULT - ASSESSMENT
ASSESSMENT: 72 years old man with vascular risk factors of age and hyperlipidemia is evaluated at [facility] for acute onset of headache and left sided weakness since 8/8/17. CT brain on admission showed right tempo-parieto-occipital ICH with surrounding vasogenic edema leading to mass effect and bring compression. He subsequently underwent craniectomy and hematoma evacuation. Impression:  Nontraumatic right hemispheric cortically located ICH. Right tempo-parieto-occipital ICH - likely etiology being possible cerebral amyloid angiopathy versus hemorrhagic transformation of ischemic stroke, favoring the former. Associated vasogenic edema leading to mass effect and brain compression      NEURO: Continue close monitoring for neurologic deterioration, s/p subgaleal drain removal (08/10), s/p Craniotomy for evacuation of hematoma  08/08, SBP <140 with titration to normotensive, no need for statin due to ICH, plan for MRI Brain w/o, MRA Head w/o and Neck w/contrast once stable. Physical therapy/OT eval with [facility], continue current rehab treatment.     ANTITHROMBOTIC THERAPY: none due to ICH    PULMONARY: s/p extubation 08/09, protecting airway, saturating well     CARDIOVASCULAR:TTE unremarkable EF 65%, cardiac monitoring no events                            GASTROINTESTINAL:  dysphagia screen passed, tolerating diet     Diet: Regular    RENAL: BUN/Cr stable, good urine output      Na Goal: Greater than 135     Brice: N    HEMATOLOGY: H/H 11.8/33.9 Platelets normal      DVT ppx: LMWH     ID: afebrile, no leukocytosis     OTHER:     DISPOSITION: D/C to [facility] as per PT/OT eval once stable and workup is complete    CORE MEASURES:        Admission NIHSS: 12     TPA:  NO      LDL/HDL: 33/44     Depression Screen: P     Statin Therapy: No     Dysphagia Screen: PASS     Smoking  NO      Afib NO     [employer] Education YES ASSESSMENT:   72 years old man with vascular risk factors of age and hyperlipidemia is evaluated at [facility] for acute onset of headache and left sided weakness since 8/8/17. CT brain on admission showed right tempo-parieto-occipital ICH with surrounding vasogenic edema leading to mass effect and bring compression. He subsequently underwent craniectomy and hematoma evacuation.     Impression:  Nontraumatic right hemispheric cortically located ICH. Right tempo-parieto-occipital ICH - likely etiology being possible cerebral amyloid angiopathy versus hemorrhagic transformation of ischemic stroke, favoring the former. Associated vasogenic edema leading to mass effect and brain compression    NEURO: Continue close monitoring for neurologic deterioration, s/p subgaleal drain removal (08/10), s/p Craniotomy for evacuation of hematoma  08/08, SBP <140 with titration to normotensive, no need for statin due to ICH, plan for MRI Brain w/o, MRA Head w/o and Neck w/contrast once stable. Physical therapy/OT eval with [facility], continue current rehab treatment.     ANTITHROMBOTIC THERAPY: none due to ICH    PULMONARY: s/p extubation 08/09, protecting airway, saturating well     CARDIOVASCULAR:TTE unremarkable EF 65%, cardiac monitoring no events                            GASTROINTESTINAL:  dysphagia screen passed, tolerating diet     Diet: Regular    RENAL: BUN/Cr stable, good urine output      Na Goal: Greater than 135     Brice: N    HEMATOLOGY: H/H 11.8/33.9 Platelets normal      DVT ppx: LMWH     ID: afebrile, no leukocytosis     OTHER:     DISPOSITION: D/C to [facility] as per PT/OT eval once stable and workup is complete    CORE MEASURES:        Admission NIHSS: 12     TPA:  NO      LDL/HDL: 33/44     Depression Screen: P     Statin Therapy: No     Dysphagia Screen: PASS     Smoking  NO      Afib NO     [employer] Education YES

## 2017-08-10 NOTE — PROGRESS NOTE ADULT - ASSESSMENT
R ICH, likely primary ischemic stroke with secondary hemorrhagic conversion, s/p decompressive craniotomy    NEURO: neurochecks q1h, obtain head/neck vascular imaging when stable, MRI when stable, ASA held due to hemorrhagic conversion and post op, stroke core measures, followup stroke consult, keppra for sz ppx to end 8/15  Activity: [X] mobilize as tolerated [] Bedrest [X] PT [X] OT [] PMNR    PULM: IS, O2 sat>92%    CV: TTE EF 65%, hold atorvastatin due to hemorrhage,    SBP goal 100-150    RENAL: monitor chemistries, supplement electrolytes as indicated, discontinue butler  Fluids: NS@100ml/hr, IVL when po intake sufficient     GI: advance diet as tolerated  Diet: Regular  GI prophylaxis [] not indicated [] PPI [] other:  Bowel regimen [x] colace [] senna [] other:    ENDO: send A1c, lipid panel  Goal euglycemia (-180)    HEME/ONC: monitor h/h, hold chemoppx given hemorrhage and post op status.   VTE prophylaxis: [X] SCDs [] chemoprophylaxis [] high risk of DVT/PE on admission due to:    ID: periop abx    MISC: PT/OT when stable    SOCIAL/FAMILY:   [] awaiting [X] updated at bedside [] family meeting    CODE STATUS:  [X] Full Code [] DNR [] DNI [] Palliative/Comfort Care    DISPOSITION:  [X] ICU [] Stroke Unit [] Floor [] EMU [] RCU [] PCU    [X] Patient is at high risk of neurologic deterioration/death due to: herniation, brain compression, seizure R ICH, likely primary ischemic stroke with secondary hemorrhagic conversion, s/p decompressive craniotomy    NEURO: neurochecks q2h, obtain head/neck vascular imaging when stable, MRI when stable, ASA per stroke team, stroke core measures, followup stroke consult, discontinue keppra  Activity: [X] mobilize as tolerated [] Bedrest [X] PT [X] OT [] PMNR    PULM: IS, O2 sat>92%    CV: TTE EF 65%, start atorvastatin    SBP goal 100-150    RENAL: monitor chemistries, supplement electrolytes as indicated  Fluids: IVL    GI:   Diet: Regular  GI prophylaxis [] not indicated [] PPI [] other:  Bowel regimen [x] colace [] senna [] other:    ENDO: a1c 5.5 LDL 33  Goal euglycemia (-180)    HEME/ONC: monitor h/h, start lovenox   VTE prophylaxis: [X] SCDs [X] chemoprophylaxis [] high risk of DVT/PE on admission due to:    ID: afebrile    MISC: PT/OT -- acute rehab    SOCIAL/FAMILY:   [] awaiting [X] updated at bedside [] family meeting    CODE STATUS:  [X] Full Code [] DNR [] DNI [] Palliative/Comfort Care    DISPOSITION:  [] ICU [X] Stroke Unit [] Floor [] EMU [] RCU [] PCU    [X] Patient is at high risk of neurologic deterioration/death due to: herniation, brain compression, seizure    Time spent 45m

## 2017-08-10 NOTE — H&P ADULT - PSH
After cataract  Right Eye IOL  IH (inguinal hernia)  Left Inguinal hernia repair 7/5/14  S/P angioplasty  2014 - [facility]

## 2017-08-10 NOTE — H&P ADULT - HISTORY OF PRESENT ILLNESS
HPI:  Mr. De La Cruz is a 73 y/o man w/ PMH of BPH, HLD, GERD, herniated cervical disc presenting with R sided headache. Last known normal was before he went to bed last night around 11pm. Pt awoke early in the morning at around 2 AM with a severe throbbing headache that behind his R eye that radiates to R occiput and down into his R neck. The pain has not spread, gotten more severe, or radiated anywhere since HA began. Pt denies any dysarthria, dysphagia, or visual changes. PT does endorse nausea, photophobia, dizziness, and lightheadedness.   Per collateral from pts girlfriend, pt called her when he got the HA and felt like he couldn't walk. When she arrived to pick him up, he was speaking fine but seemed to be confused, unable to walk, or see some things that were in front of him.    Son (John [phone], [phone]) & daughter (Shiela De Oliveira) who are both away in [place]. Girlfriend provided collateral information.

## 2017-08-10 NOTE — H&P ADULT - NSHPPHYSICALEXAM_GEN_ALL_CORE
General Examination:      Pt appears drowsy or lethargic laying in bed. Responds to verbal stimuli, sometimes needs repetition of questions or commands to elicit a response.      A+Ox4.  Attention impaired. No dysarthria or aphasia. L sided hemineglect. Able to follow some simple commands.     Cranial nerves: PERRLA, EOMI, Intact blink to threat on R, no blink to threat on L. Facial nerve intact. Tongue midlin     Motor:  Strength 5/5 in RUE and RLE. Strength 2/5 in LUE and LLE, moves in response to noxious stimuli  Pronator drift not assessed due to noncompliance     Sensory:  Light touch sensation intact on R side. Intact to noxious stimuli on L

## 2017-08-10 NOTE — PHYSICAL THERAPY INITIAL EVALUATION ADULT - GENERAL OBSERVATIONS, REHAB EVAL
Received in bed, alert & verbally responsive, +dressing in R side of the head C/D/I, +cardiac monitor, +IV, daughter at bedside, without complaints, agreeable to PT

## 2017-08-10 NOTE — H&P ADULT - ASSESSMENT
Pt is a  71 y/o man w/ PMH of BPH, HLD, GERD, R sided headache with phys exam showing signs of RMCA stroke. CT showing hemorrhagic conversion in the temporal parietal occipital region. NIHSS 9. MRS 0. ICH 2.  - Recommend admit to NSCU for Q1hr neurochecks and hemicraniectomy watch based on symptomatic presentation  - Na goals 145-150  - Pain control  - Repeat CTH

## 2017-08-11 PROCEDURE — 99233 SBSQ HOSP IP/OBS HIGH 50: CPT

## 2017-08-11 PROCEDURE — 99222 1ST HOSP IP/OBS MODERATE 55: CPT | Mod: GC

## 2017-08-11 RX ORDER — ACETAMINOPHEN 500 MG
1000 TABLET ORAL ONCE
Qty: 0 | Refills: 0 | Status: COMPLETED | OUTPATIENT
Start: 2017-08-11 | End: 2017-08-11

## 2017-08-11 RX ADMIN — OXYCODONE AND ACETAMINOPHEN 1 TABLET(S): 5; 325 TABLET ORAL at 22:10

## 2017-08-11 RX ADMIN — ENOXAPARIN SODIUM 40 MILLIGRAM(S): 100 INJECTION SUBCUTANEOUS at 17:13

## 2017-08-11 RX ADMIN — Medication 100 MILLIGRAM(S): at 05:10

## 2017-08-11 RX ADMIN — POLYETHYLENE GLYCOL 3350 17 GRAM(S): 17 POWDER, FOR SOLUTION ORAL at 12:02

## 2017-08-11 RX ADMIN — ATORVASTATIN CALCIUM 40 MILLIGRAM(S): 80 TABLET, FILM COATED ORAL at 21:34

## 2017-08-11 RX ADMIN — Medication 100 MILLIGRAM(S): at 14:14

## 2017-08-11 RX ADMIN — Medication 400 MILLIGRAM(S): at 04:47

## 2017-08-11 RX ADMIN — Medication 1000 MILLIGRAM(S): at 05:18

## 2017-08-11 RX ADMIN — OXYCODONE AND ACETAMINOPHEN 1 TABLET(S): 5; 325 TABLET ORAL at 21:37

## 2017-08-11 RX ADMIN — Medication 100 MILLIGRAM(S): at 21:34

## 2017-08-11 RX ADMIN — Medication 650 MILLIGRAM(S): at 14:14

## 2017-08-11 NOTE — DIETITIAN INITIAL EVALUATION ADULT. - NS AS NUTRI INTERV MEALS SNACK
encourage adequate energy intake, with emphasis on HBV protein food for surgical healing/General/healthful diet

## 2017-08-11 NOTE — PROGRESS NOTE ADULT - SUBJECTIVE AND OBJECTIVE BOX
Patient is a 72y old  Male who presents with a chief complaint of Headache (10 Aug 2017 09:20)  Found to have ICH    INTERVAL HPI/OVERNIGHT EVENTS:    MEDICATIONS  (STANDING):  docusate sodium 100 milliGRAM(s) Oral three times a day  atorvastatin 40 milliGRAM(s) Oral at bedtime  polyethylene glycol 3350 17 Gram(s) Oral daily  enoxaparin Injectable 40 milliGRAM(s) SubCutaneous <User Schedule>    MEDICATIONS  (PRN):  acetaminophen    Suspension. 650 milliGRAM(s) Oral every 6 hours PRN Mild Pain (1 - 3)  ondansetron Injectable 4 milliGRAM(s) IV Push every 6 hours PRN Nausea and/or Vomiting  oxyCODONE    5 mG/acetaminophen 325 mG 1 Tablet(s) Oral every 4 hours PRN Severe Pain (7 - 10)  acetaminophen   Tablet 650 milliGRAM(s) Oral every 6 hours PRN For Temp greater than 38 C (100.4 F)        No Known Allergies    Intolerances        REVIEW OF SYSTEMS:  CARDIOVASCULAR: No chest pain, palpitations, dizziness, or leg swelling; no shortness of breath     RESPIRATORY: No cough, wheezing, chills or hemoptysis; No shortness of breath    GASTROINTESTINAL: No abdominal or epigastric pain. No nausea, vomiting, or hematemesis; No diarrhea or constipation. No melena or hematochezia.    NEUROLOGICAL: No headaches, memory loss, loss of strength, numbness      PHYSICAL EXAM:  Vital Signs Last 24 Hrs  T(C): 36.8 (11 Aug 2017 03:00), Max: 37 (10 Aug 2017 15:00)  T(F): 98.2 (11 Aug 2017 03:00), Max: 98.6 (10 Aug 2017 15:00)  HR: 63 (11 Aug 2017 03:00) (53 - 73)  BP: 149/70 (11 Aug 2017 03:00) (93/45 - 149/70)  BP(mean): 92 (11 Aug 2017 03:00) (62 - 109)  RR: 18 (11 Aug 2017 03:00) (12 - 19)  SpO2: 97% (11 Aug 2017 03:00) (91% - 97%)    GENERAL: NAD, well-groomed, well-developed  HEAD:  Atraumatic, Normocephalic  EYES: EOMI, PERRLA, conjunctiva and sclera clear  NECK: Supple, No JVD, Normal thyroid  NERVOUS SYSTEM:  Alert & Oriented X3, Good concentration; moves left upper and lower extremities spontaneously  ChEST/LUNG: Clear to auscultation bilaterally; No rales, rhonchi, wheezing, or rubs  HEART: S1S2 regular, without murmur, rub nor gallop  ABDOMEN: Soft, Nontender, Nondistended; Bowel sounds present  EXTREMITIES:  2+ Peripheral Pulses, No clubbing, cyanosis, or edema      LABS:                        12.1   8.6   )-----------( 181      ( 10 Aug 2017 21:20 )             35.7     10 Aug 2017 21:20    141    |  106    |  16     ----------------------------<  109    4.2     |  24     |  0.87     Ca    8.5        10 Aug 2017 21:20  Phos  2.6       10 Aug 2017 21:20  Mg     2.1       10 Aug 2017 21:20        CAPILLARY BLOOD GLUCOSE              assessment:  POD 1 evacuation ICH    Plan:  awaiting MRA, PT and OT

## 2017-08-11 NOTE — PROGRESS NOTE ADULT - ASSESSMENT
R ICH, likely primary ischemic stroke with secondary hemorrhagic conversion, s/p decompressive craniotomy    NEURO: neurochecks q2h, obtain head/neck vascular imaging when stable, MRI when stable, ASA per stroke team, stroke core measures, followup stroke consult, discontinue keppra  Activity: [X] mobilize as tolerated [] Bedrest [X] PT [X] OT [] PMNR    PULM: IS, O2 sat>92%    CV: TTE EF 65%, start atorvastatin    SBP goal 100-150    RENAL: monitor chemistries, supplement electrolytes as indicated  Fluids: IVL    GI:   Diet: Regular  GI prophylaxis [] not indicated [] PPI [] other:  Bowel regimen [x] colace [] senna [] other:    ENDO: a1c 5.5 LDL 33  Goal euglycemia (-180)    HEME/ONC: monitor h/h, start lovenox   VTE prophylaxis: [X] SCDs [X] chemoprophylaxis [] high risk of DVT/PE on admission due to:    ID: afebrile    MISC: PT/OT -- acute rehab    SOCIAL/FAMILY:   [] awaiting [X] updated at bedside [] family meeting    CODE STATUS:  [X] Full Code [] DNR [] DNI [] Palliative/Comfort Care    DISPOSITION:  [] ICU [X] Stroke Unit [] Floor [] EMU [] RCU [] PCU    [X] Patient is at high risk of neurologic deterioration/death due to: herniation, brain compression, seizure    Time spent 45m R ICH, likely primary ischemic stroke with secondary hemorrhagic conversion, s/p decompressive craniotomy POD3    NEURO: neurochecks q2h, obtain head/neck vascular imaging when stable, MRI when stable, ASA per stroke team, stroke core measures, followup stroke consult, discontinue keppra  Activity: [X] mobilize as tolerated [] Bedrest [X] PT [X] OT [] PMNR    PULM: IS, O2 sat>92%    CV: TTE EF 65%, start atorvastatin    SBP goal 100-150    RENAL: monitor chemistries, supplement electrolytes as indicated  Fluids: IVL    GI:   Diet: Regular  GI prophylaxis [] not indicated [] PPI [] other:  Bowel regimen [x] colace [] senna [] other:    ENDO: a1c 5.5 LDL 33  Goal euglycemia (-180)    HEME/ONC: monitor h/h, start lovenox   VTE prophylaxis: [X] SCDs [X] chemoprophylaxis [] high risk of DVT/PE on admission due to:    ID: afebrile    MISC: PT/OT -- acute rehab    SOCIAL/FAMILY:   [] awaiting [X] updated at bedside [] family meeting    CODE STATUS:  [X] Full Code [] DNR [] DNI [] Palliative/Comfort Care    DISPOSITION:  [] ICU [X] Stroke Unit [] Floor [] EMU [] RCU [] PCU    [X] Patient is at high risk of neurologic deterioration/death due to: herniation, brain compression, seizure    Time spent 45m

## 2017-08-11 NOTE — PROGRESS NOTE ADULT - SUBJECTIVE AND OBJECTIVE BOX
HPI:  HPI:  72M w/ PMHx of BPH, HLD, GERD, herniated cervical disc who presented with R sided headache. Last known normal was before he went to bed on 8/7 at 11pm. Pt awoke early in the morning at around 2 AM with a severe throbbing headache that behind his R eye that radiated to R occiput and down into his R neck. Pt called his girlfriend and she reports that on arrival she noted that he was unable to walk, seemed confused and had difficulty seeing properly.  He was admitted to [facility] ED at 0500, CT findings suggestive of ischemic stroke with hemorrhagic conversion. Underwent emergent craniotomy for evacuation of hematoma and [facility]    Son (John [phone], [phone]) & daughter (Shiela De Oliveira) who are both away in [place]. Girlfriend provided collateral information. (10 Aug 2017 09:20)    SURGERY: Craniotomy for evacuation of hematoma    PAST MEDICAL HISTORY: Herniated disc, cervical  BPH (benign prostatic hypertrophy)  GERD (gastroesophageal reflux disease)  Hyperlipidemia    PAST SURGICAL HISTORY: S/P angioplasty  IH (inguinal hernia)  After cataract    FAMILY HISTORY:  No pertinent family history in first degree relatives    ALLERGIES: No Known Allergies    **************************************  **************************************    OVERNIGHT EVENTS: [] None    ROS  Unobtainable due to mental status[] Negative []  Positives:    ADMISSION SCORES: GCS: HH: MF: NIHSS: RASS: CAM-ICU: ICP:    ICU Vital Signs Last 24 Hrs  T(C): 36.8 (11 Aug 2017 03:00), Max: 37 (10 Aug 2017 15:00)  T(F): 98.2 (11 Aug 2017 03:00), Max: 98.6 (10 Aug 2017 15:00)  HR: 63 (11 Aug 2017 03:00) (53 - 73)  BP: 149/70 (11 Aug 2017 03:00) (93/45 - 149/70)  BP(mean): 92 (11 Aug 2017 03:00) (62 - 109)  ABP: --  ABP(mean): --  RR: 18 (11 Aug 2017 03:00) (12 - 19)  SpO2: 97% (11 Aug 2017 03:00) (91% - 97%)     08-10 @ 07:01  -  08-11 @ 07:00  --------------------------------------------------------  IN: 480 mL / OUT: 3450 mL / NET: -2970 mL           DEVICES: [] Restraints [] SRINI/HMV []LD [] ET tube [] Trach [] Chest Tube [] A-line [] Brice [] NGT [] Rectal Tube [] EVD [] CVL  [] ICP/LiCOx    NEUROIMAGING:     EEG REPORT:     MEDICATIONS:  acetaminophen    Suspension. 650 milliGRAM(s) Oral every 6 hours PRN  ondansetron Injectable 4 milliGRAM(s) IV Push every 6 hours PRN  docusate sodium 100 milliGRAM(s) Oral three times a day  oxyCODONE    5 mG/acetaminophen 325 mG 1 Tablet(s) Oral every 4 hours PRN  acetaminophen   Tablet 650 milliGRAM(s) Oral every 6 hours PRN  atorvastatin 40 milliGRAM(s) Oral at bedtime  polyethylene glycol 3350 17 Gram(s) Oral daily  enoxaparin Injectable 40 milliGRAM(s) SubCutaneous <User Schedule>      PHYSICAL EXAM:  General: no distress  Neurological: awake, oriented x3, perrl, midline gaze, tracks bilaterally, L hemivisual neglect. responds to noxious stimulus throughout. mild L facial droop  Lungs: RUE 5/5, LUE proximal 4, distal 4+, LLE proximal 4 distal 5.   Lungs Clear to auscultation  Heart: S1S2  Abdomen: Soft, nontender, BS present  Extremities: No edema    LABS:                        12.1   8.6   )-----------( 181      ( 10 Aug 2017 21:20 )             35.7    08-10    141  |  106  |  16  ----------------------------<  109<H>  4.2   |  24  |  0.87    Ca    8.5      10 Aug 2017 21:20  Phos  2.6     08-10  Mg     2.1     08-10 HPI:  72M w/ PMHx of BPH, HLD, GERD, herniated cervical disc who presented with R sided headache. Last known normal was before he went to bed on 8/7 at 11pm. Pt awoke early in the morning at around 2 AM with a severe throbbing headache that behind his R eye that radiated to R occiput and down into his R neck. Pt called his girlfriend and she reports that on arrival she noted that he was unable to walk, seemed confused and had difficulty seeing properly.  He was admitted to [facility] ED at 0500, CT findings suggestive of ischemic stroke with hemorrhagic conversion. Underwent emergent craniotomy for evacuation of hematoma and [facility]    Son (John [phone], [phone]) & daughter (Shiela De Oliveira) who are both away in [place]. Girlfriend provided collateral information. (10 Aug 2017 09:20)    SURGERY: Craniotomy for evacuation of hematoma    PAST MEDICAL HISTORY: Herniated disc, cervical  BPH (benign prostatic hypertrophy)  GERD (gastroesophageal reflux disease)  Hyperlipidemia    PAST SURGICAL HISTORY: S/P angioplasty  IH (inguinal hernia)  After cataract    FAMILY HISTORY:  No pertinent family history in first degree relatives    ALLERGIES: No Known Allergies    **************************************  **************************************    OVERNIGHT EVENTS: [] None    ROS  Unobtainable due to mental status[] Negative []  Positives: mild headache    ICU Vital Signs Last 24 Hrs  T(C): 36.8 (11 Aug 2017 03:00), Max: 37 (10 Aug 2017 15:00)  T(F): 98.2 (11 Aug 2017 03:00), Max: 98.6 (10 Aug 2017 15:00)  HR: 63 (11 Aug 2017 03:00) (53 - 73)  BP: 149/70 (11 Aug 2017 03:00) (93/45 - 149/70)  BP(mean): 92 (11 Aug 2017 03:00) (62 - 109)  ABP: --  ABP(mean): --  RR: 18 (11 Aug 2017 03:00) (12 - 19)  SpO2: 97% (11 Aug 2017 03:00) (91% - 97%)     08-10 @ 07:01  -  08-11 @ 07:00  --------------------------------------------------------  IN: 480 mL / OUT: 3450 mL / NET: -2970 mL       DEVICES: [] Restraints [] SRINI/HMV []LD [] ET tube [] Trach [] Chest Tube [] A-line [] Brice [] NGT [] Rectal Tube [] EVD [] CVL  [] ICP/LiCOx    MEDICATIONS:  acetaminophen    Suspension. 650 milliGRAM(s) Oral every 6 hours PRN  ondansetron Injectable 4 milliGRAM(s) IV Push every 6 hours PRN  docusate sodium 100 milliGRAM(s) Oral three times a day  oxyCODONE    5 mG/acetaminophen 325 mG 1 Tablet(s) Oral every 4 hours PRN  acetaminophen   Tablet 650 milliGRAM(s) Oral every 6 hours PRN  atorvastatin 40 milliGRAM(s) Oral at bedtime  polyethylene glycol 3350 17 Gram(s) Oral daily  enoxaparin Injectable 40 milliGRAM(s) SubCutaneous <User Schedule>    PHYSICAL EXAM:  General: no distress  Neurological: awake, oriented x3, perrl, midline gaze, tracks bilaterally, L hemivisual neglect. responds to noxious stimulus throughout. mild L facial droop  Lungs: RUE 5/5, LUE proximal 4, distal 4+, LLE proximal 4 distal 5.   Lungs Clear to auscultation  Heart: S1S2  Abdomen: Soft, nontender, BS present  Extremities: No edema    LABS:                        12.1   8.6   )-----------( 181      ( 10 Aug 2017 21:20 )             35.7    08-10    141  |  106  |  16  ----------------------------<  109<H>  4.2   |  24  |  0.87    Ca    8.5      10 Aug 2017 21:20  Phos  2.6     08-10  Mg     2.1     08-10

## 2017-08-11 NOTE — CONSULT NOTE ADULT - SUBJECTIVE AND OBJECTIVE BOX
HPI:  Mr. De La Cruz is a 71 y/o man with PMHx of BPH, HLD, GERD, herniated cervical disc who presented to [facility] on 8/8/17 with right sided headache. He was last known normal before he went to bed the night prior around 11pm. He awoke early in the morning around 2AM with a severe throbbing headache behind his right eye that radiated to the right occiput and down into the right side of his neck. The pain was associated with nausea, photophobia, dizziness, and lightheadedness. He then called his girlfriend when he felt like he couldn't walk and when she came to pick him up he seemed confused as per the girlfriend. In ED, CTH showed right temero-occipital intraparenchymal hemorrhage, measuring 6x3.5x4cm, with leftward shift. NIHSS = 9. He subsequently underwent a right craniotomy and hematoma evacuation on 8/8/17 by Dr. Willoughby with EBL = 250cc Extubated on 8/9. Subgaleal drain removed on 8/10. Intraparenchymal hemorrhage believed to be ischemic CVA with hemorrhagic transformation. MRI/MRA brain pending.     HbA1C - 5.5  CHL - 109  Tri - 160  HDL - 44  LDL -33  TTE EF = 65%    REVIEW OF SYSTEMS  Constitutional - No fever, No fatigue  HEENT - No visual disturbances, No difficulty hearing,  No neck pain  Respiratory - No cough, No wheezing, No shortness of breath  Cardiovascular - No chest pain, No palpitations  Gastrointestinal - No abdominal pain, No nausea, No vomiting, No diarrhea, No constipation  Genitourinary - No dysuria, No frequency, No hematuria, No incontinence  Neurological - No headaches, No loss of strength, No numbness  Skin - No itching, No rashes  Endocrine - No temperature intolerance  Musculoskeletal - No joint pain, No joint swelling, No muscle pain  Psychiatric - No depression, No anxiety  All other review of systems negative    PAST MEDICAL & SURGICAL HISTORY  Cervical herniated disc  BPH   GERD   Hyperlipidemia  S/P angioplasty  Inguinal hernia    SOCIAL HISTORY  Retired - Former [employer]  Son (John [phone], [phone]) & daughter (Shiela De Oliveira)  Smoking - Denied  EtOH - Denied   Drugs - Denied    FUNCTIONAL HISTORY  Lives with alone (girlfriend stays over on weekends) in a private home with 3 MADDISON + left side HR, has 1st floor set up  Independent in ambulation, ADL's, transfers prior to hospitalization. Owns a RW, quad cane, and SAC from previous back surgery    CURRENT FUNCTIONAL STATUS  Bed mobility - Min A  Transfers - Min A/Contact guard  Gait - Ambulated 3' using RW requiring Min A  ADL's - LE dressing Mod A    FAMILY HISTORY   Reviewed and non-contributory    ALLERGIES  No Known Allergies    VITALS  T(C): 36.7 (08-11-17 @ 08:00)  T(F): 98.1 (08-11-17 @ 08:00), Max: 98.6 (08-10-17 @ 15:00)  HR: 63 (08-11-17 @ 08:00) (55 - 73)  BP: 143/77 (08-11-17 @ 08:00) (103/52 - 149/70)  RR:  (14 - 19)  SpO2:  (91% - 99%)  Wt(kg): --    PHYSICAL EXAM  Constitutional - NAD, Comfortable  HEENT - NCAT, EOMI  Neck - Supple, No limited ROM  Chest - CTA bilaterally, No wheeze, No rhonchi, No crackles  Cardiovascular - RRR, S1S2, No murmurs  Abdomen - BS+, Soft, NTND  Extremities - No C/C/E, No calf tenderness   Neurologic Exam -                    Cognitive - Awake, Alert, AAO to self, place, date, year, situation     Communication - Fluent, No dysarthria     Cranial Nerves - CN 2-12 intact     Motor - No focal deficits                    LEFT    UE - ShAB 5/5, EF 5/5, EE 5/5, WE 5/5,  5/5                    RIGHT UE - ShAB 5/5, EF 5/5, EE 5/5, WE 5/5,  5/5                    LEFT    LE - HF 5/5, KE 5/5, DF 5/5, PF 5/5                    RIGHT LE - HF 5/5, KE 5/5, DF 5/5, PF 5/5        Sensory - Intact to LT     Reflexes - DTR Intact, No primitive reflexive     Coordination - FTN intact     OculoVestibular - No saccades, No nystagmus, VOR         Balance - WNL Static  Psychiatric - Mood stable, Affect WNL    RECENT LABS/IMAGING             12.1   8.6   )-----------( 181      ( 10 Aug 2017 21:20 )             35.7     141  |  106  |  16  ----------------------------<  109    ( 10 Aug 2017 )  4.2   |  24  |  0.87    Ca    8.5      10 Aug 2017 21:20  Phos  2.6     08-10  Mg     2.1     08-10    MEDICATIONS   MEDICATIONS  (STANDING):  docusate sodium 100 milliGRAM(s) Oral three times a day  atorvastatin 40 milliGRAM(s) Oral at bedtime  polyethylene glycol 3350 17 Gram(s) Oral daily  enoxaparin Injectable 40 milliGRAM(s) SubCutaneous <User Schedule>    MEDICATIONS  (PRN):  acetaminophen    Suspension. 650 milliGRAM(s) Oral every 6 hours PRN Mild Pain (1 - 3)  ondansetron Injectable 4 milliGRAM(s) IV Push every 6 hours PRN Nausea and/or Vomiting  oxyCODONE    5 mG/acetaminophen 325 mG 1 Tablet(s) Oral every 4 hours PRN Severe Pain (7 - 10)  acetaminophen   Tablet 650 milliGRAM(s) Oral every 6 hours PRN For Temp greater than 38 C (100.4 F)    ASSESSMENT/PLAN  71 y/o M with throbbing headache found to have right temero-occipital intraparenchymal hemorrhage believed to be ischemic CVA with hemorrhagic transformation, with functional, gait, ADL, impairments.    Secondary CVA Prevention - Lipitor 40mg PO QHS  PT- ROM, Bed Mob, Transfers, Amb w AD and bracing as needed  OT- ADLs, bracing  SLP- Dysphagia eval and treat  Prec- Falls, Cardiac  DVT Prophylaxis  Skin- Turn Q2hrs  Pain - Tylenol PRN, Percocet PRN  Weight bearing status - Full weight bearing  Diet - Regular/thins  Dispo- HPI:  Mr. De La Cruz is a 71 y/o man with PMHx of BPH, HLD, GERD, herniated cervical disc s/p diskectomy and fusion who presented to [facility] on 8/8/17 with severe right sided headache. He was last known normal before he went to bed the night prior around 11pm. He awoke early in the morning around 2AM with a severe throbbing headache behind his right eye that radiated to the right occiput and down into the right side of his neck. The pain was associated with nausea, photophobia, dizziness, and lightheadedness. He then called his girlfriend when he felt like he couldn't walk and when she came to pick him up he seemed confused as per the girlfriend. In ED, CTH showed right temero-occipital intraparenchymal hemorrhage, measuring 6x3.5x4cm, with leftward shift. NIHSS = 9. He subsequently underwent a right craniotomy and hematoma evacuation on 8/8/17 by Dr. Willoughby with EBL = 250cc Extubated on 8/9. Subgaleal drain removed on 8/10. Intraparenchymal hemorrhage believed to be ischemic CVA with hemorrhagic transformation. MRI/MRA brain pending.     HbA1C - 5.5  CHL - 109  Tri - 160  HDL - 44  LDL -33  TTE EF = 65%    REVIEW OF SYSTEMS  Constitutional - No fever, No fatigue  HEENT - +Headache (improving), +Left side visual loss, No difficulty hearing,  No neck pain  Respiratory - No cough, No wheezing, No shortness of breath  Cardiovascular - No chest pain, No palpitations  Gastrointestinal - No abdominal pain, No nausea, No vomiting, No diarrhea, No constipation  Genitourinary - No dysuria, No frequency, No hematuria, No incontinence  Neurological - +Headache (improving), No loss of strength, No numbness  Skin - No itching, No rashes  Endocrine - No temperature intolerance  Musculoskeletal - No joint pain, No joint swelling, No muscle pain  Psychiatric - No depression, No anxiety  All other review of systems negative    PAST MEDICAL & SURGICAL HISTORY  Cervical herniated disc s/p diskectomy and fusion (?C2/C3), required 2 surgeries  BPH   GERD   Hyperlipidemia  Inguinal hernia s/p hernia repair x2    SOCIAL HISTORY  Retired - Former [employer]  Son (John [phone], [phone]) & daughter (Shiela De Oliveira)  Smoking - Former, quit around age 33 but previously smoked 3ppd  EtOH - Socially  Drugs - Denied    FUNCTIONAL HISTORY  Lives with alone (girlfriend stays over on weekends) in a private ranch style home with 3 [place] + left side HR  Independent in ambulation, ADL's, transfers prior to hospitalization. Owns a RW, quad cane, and SAC from previous cervical spine surgery  Enjoys gardening. Previously driving    CURRENT FUNCTIONAL STATUS  Bed mobility - Min A  Transfers - Min A/Contact guard  Gait - Ambulated 3' using RW requiring Min A  ADL's - LE dressing Mod A    FAMILY HISTORY   Reviewed and non-contributory    ALLERGIES  No Known Allergies    VITALS  T(C): 36.7 (08-11-17 @ 08:00)  T(F): 98.1 (08-11-17 @ 08:00), Max: 98.6 (08-10-17 @ 15:00)  HR: 63 (08-11-17 @ 08:00) (55 - 73)  BP: 143/77 (08-11-17 @ 08:00) (103/52 - 149/70)  RR:  (14 - 19)  SpO2:  (91% - 99%)  Wt(kg): --    PHYSICAL EXAM  Constitutional - NAD, Comfortable  HEENT - +Right scalp posterior incisions with staples C/D/I, EOMI  Neck - Supple  Chest - CTA bilaterally  Cardiovascular - RRR, S1S2  Abdomen - BS+, Soft, NTND  Extremities - No C/C/E, No calf tenderness   Neurologic Exam -                    Cognitive - Awake, Alert, AAO to self, place, date, year, not entire situation, +Left sided neglect     Communication - Fluent, No dysarthria, naming and repetition intact.     Attention - Intact, able to recite days of week backwards     Memory - Intact with cueing     Cranial Nerves - ?Left homonymous hemianopsia     Motor - No focal deficits                    LEFT    UE - ShAB 5/5, EF 5/5, EE 5/5, WE 5/5,  5/5                    RIGHT UE - ShAB 5/5, EF 5/5, EE 5/5, WE 5/5,  5/5                    LEFT    LE - HF 5/5, KE 5/5, DF 5/5, PF 5/5                    RIGHT LE - HF 5/5, KE 5/5, DF 5/5, PF 5/5        Sensory - Intact to LT, Impaired extinction, No clonus     Reflexes - DTR Intact, No primitive reflexive  Psychiatric - Affect WNL    RECENT LABS/IMAGING             12.1   8.6   )-----------( 181      ( 10 Aug 2017 21:20 )             35.7     141  |  106  |  16  ----------------------------<  109    ( 10 Aug 2017 )  4.2   |  24  |  0.87    Ca    8.5      10 Aug 2017 21:20  Phos  2.6     08-10  Mg     2.1     08-10    MEDICATIONS   MEDICATIONS  (STANDING):  docusate sodium 100 milliGRAM(s) Oral three times a day  atorvastatin 40 milliGRAM(s) Oral at bedtime  polyethylene glycol 3350 17 Gram(s) Oral daily  enoxaparin Injectable 40 milliGRAM(s) SubCutaneous <User Schedule>    MEDICATIONS  (PRN):  acetaminophen    Suspension. 650 milliGRAM(s) Oral every 6 hours PRN Mild Pain (1 - 3)  ondansetron Injectable 4 milliGRAM(s) IV Push every 6 hours PRN Nausea and/or Vomiting  oxyCODONE    5 mG/acetaminophen 325 mG 1 Tablet(s) Oral every 4 hours PRN Severe Pain (7 - 10)  acetaminophen   Tablet 650 milliGRAM(s) Oral every 6 hours PRN For Temp greater than 38 C (100.4 F)    ASSESSMENT/PLAN  71 y/o M with throbbing headache found to have right temero-occipital intraparenchymal hemorrhage believed to be ischemic CVA with hemorrhagic transformation, with functional, gait, ADL, cognitive, visual impairments.     Dispo- - Recommend ACUTE inpatient rehabilitation for the functional deficits consisting of 3 hours of therapy/day & 24 hour RN/daily PMR physician for comorbid medical management.   PT- ROM, Bed Mob, Transfers, Amb with AD  OT- ADLs, ROM  SLP- Cognitive evaluation and treatment  Prec- Falls, Cardiac    Recommend encouraging addressing patient from left side and having family members sit at patient's left side as patient with left sided neglect.  Secondary CVA Prevention - Lipitor 40mg PO QHS  DVT Prophylaxis - Lovenox 40mg SC Q24hrs  Skin- Turn Q2hrs  Pain - Tylenol PRN, Percocet PRN  Weight bearing status - Full weight bearing  Diet - Regular/thins HPI:  Mr. De La Cruz is a 73 y/o man with PMHx of BPH, HLD, GERD, herniated cervical disc s/p diskectomy and fusion who presented to [facility] on 8/8/17 with severe right sided headache. He was last known normal before he went to bed the night prior around 11pm. He awoke early in the morning around 2AM with a severe throbbing headache behind his right eye that radiated to the right occiput and down into the right side of his neck. The pain was associated with nausea, photophobia, dizziness, and lightheadedness. He then called his girlfriend when he felt like he couldn't walk and when she came to pick him up he seemed confused as per the girlfriend. In ED, CTH showed right temero-occipital intraparenchymal hemorrhage, measuring 6x3.5x4cm, with leftward shift. NIHSS = 9. He subsequently underwent a right craniotomy and hematoma evacuation on 8/8/17 by Dr. Willoughby with EBL = 250cc Extubated on 8/9. Subgaleal drain removed on 8/10. Intraparenchymal hemorrhage believed to be ischemic CVA with hemorrhagic transformation. MRI/MRA brain pending.     HbA1C - 5.5  CHL - 109  Tri - 160  HDL - 44  LDL -33  TTE EF = 65%    REVIEW OF SYSTEMS  Constitutional - No fever, No fatigue  HEENT - +Headache (improving), +Left side visual loss, No difficulty hearing,  No neck pain  Respiratory - No cough, No wheezing, No shortness of breath  Cardiovascular - No chest pain, No palpitations  Gastrointestinal - No abdominal pain, No nausea, No vomiting, No diarrhea, No constipation  Genitourinary - No dysuria, No frequency, No hematuria, No incontinence  Neurological - +Headache (improving), No loss of strength, No numbness  Skin - No itching, No rashes  Endocrine - No temperature intolerance  Musculoskeletal - No joint pain, No joint swelling, No muscle pain  Psychiatric - No depression, No anxiety  All other review of systems negative    PAST MEDICAL & SURGICAL HISTORY  Cervical herniated disc s/p diskectomy and fusion (?C2/C3), required 2 surgeries  BPH   GERD   Hyperlipidemia  Inguinal hernia s/p hernia repair x2    SOCIAL HISTORY  Retired - Former [employer]  Son (John [phone], [phone]) & daughter (Shiela De Oliveira)  Smoking - Former, quit around age 33 but previously smoked 3ppd  EtOH - Socially  Drugs - Denied    FUNCTIONAL HISTORY  Lives with alone (girlfriend stays over on weekends) in a private ranch style home with 3 [place] + left side HR  Independent in ambulation, ADL's, transfers prior to hospitalization. Owns a RW, quad cane, and SAC from previous cervical spine surgery  Enjoys gardening. Previously driving    CURRENT FUNCTIONAL STATUS  Bed mobility - Min A  Transfers - Min A/Contact guard  Gait - Ambulated 3' using RW requiring Min A  ADL's - LE dressing Mod A    FAMILY HISTORY   Reviewed and non-contributory    ALLERGIES  No Known Allergies    VITALS  T(C): 36.7 (08-11-17 @ 08:00)  T(F): 98.1 (08-11-17 @ 08:00), Max: 98.6 (08-10-17 @ 15:00)  HR: 63 (08-11-17 @ 08:00) (55 - 73)  BP: 143/77 (08-11-17 @ 08:00) (103/52 - 149/70)  RR:  (14 - 19)  SpO2:  (91% - 99%)  Wt(kg): --    PHYSICAL EXAM  Constitutional - NAD, Comfortable  HEENT - +Right scalp posterior incisions with staples C/D/I, EOMI  Neck - Supple  Chest - CTA bilaterally  Cardiovascular - RRR, S1S2  Abdomen - BS+, Soft, NTND  Extremities - No C/C/E, No calf tenderness   Neurologic Exam -                    Cognitive - Awake, Alert, AAO to self, place, date, year, not entire situation, +Left sided neglect     Communication - Fluent, No dysarthria, naming and repetition intact.     Attention - Intact, able to recite days of week backwards     Memory - Intact with cueing     Cranial Nerves - ?Left homonymous hemianopsia     Motor - No focal deficits                    LEFT    UE - ShAB 5/5, EF 5/5, EE 5/5, WE 5/5,  5/5                    RIGHT UE - ShAB 5/5, EF 5/5, EE 5/5, WE 5/5,  5/5                    LEFT    LE - HF 5/5, KE 5/5, DF 5/5, PF 5/5                    RIGHT LE - HF 5/5, KE 5/5, DF 5/5, PF 5/5        Sensory - Intact to LT, Impaired extinction, No clonus     Reflexes - DTR Intact, No primitive reflexive  Psychiatric - Affect WNL    RECENT LABS/IMAGING             12.1   8.6   )-----------( 181      ( 10 Aug 2017 21:20 )             35.7     141  |  106  |  16  ----------------------------<  109    ( 10 Aug 2017 )  4.2   |  24  |  0.87    Ca    8.5      10 Aug 2017 21:20  Phos  2.6     08-10  Mg     2.1     08-10    MEDICATIONS   MEDICATIONS  (STANDING):  docusate sodium 100 milliGRAM(s) Oral three times a day  atorvastatin 40 milliGRAM(s) Oral at bedtime  polyethylene glycol 3350 17 Gram(s) Oral daily  enoxaparin Injectable 40 milliGRAM(s) SubCutaneous <User Schedule>    MEDICATIONS  (PRN):  acetaminophen    Suspension. 650 milliGRAM(s) Oral every 6 hours PRN Mild Pain (1 - 3)  ondansetron Injectable 4 milliGRAM(s) IV Push every 6 hours PRN Nausea and/or Vomiting  oxyCODONE    5 mG/acetaminophen 325 mG 1 Tablet(s) Oral every 4 hours PRN Severe Pain (7 - 10)  acetaminophen   Tablet 650 milliGRAM(s) Oral every 6 hours PRN For Temp greater than 38 C (100.4 F)    ASSESSMENT/PLAN  73 y/o M with throbbing headache found to have right temero-occipital intraparenchymal hemorrhage believed to be ischemic CVA with hemorrhagic transformation, with functional, gait, ADL, cognitive, visual impairments.     Dispo- - Recommend ACUTE inpatient rehabilitation for the functional deficits consisting of 3 hours of therapy/day & 24 hour RN/daily PM&R physician for comorbid medical management.   PT- ROM, Bed Mob, Transfers, Amb with AD  OT- ADLs, ROM  SLP- Cognitive evaluation and treatment  Prec- Falls, Cardiac    Recommend encouraging addressing patient from left side and having family members sit at patient's left side as patient with left sided neglect.  Secondary CVA Prevention - Lipitor 40mg PO QHS  DVT Prophylaxis - Lovenox 40mg SC Q24hrs  Skin- Turn Q2hrs  Pain - Tylenol PRN, Percocet PRN  Weight bearing status - Full weight bearing  Diet - Regular/thins

## 2017-08-11 NOTE — DIETITIAN INITIAL EVALUATION ADULT. - ADHERENCE
patient does not follow any therapeutic diet at home, reports he is a big meat eater, which he eats with salads at most meals. Also cooks many Italian dishes at home

## 2017-08-11 NOTE — DIETITIAN INITIAL EVALUATION ADULT. - OTHER INFO
Patient seen for ICU length of stay. He denies food allergies, denies difficulty chewing/swallowing, without complaints of GI distress, although he confirms he has had no BM since admission. Reports good appetite and good PO intake at present.

## 2017-08-12 LAB
ANION GAP SERPL CALC-SCNC: 13 MMOL/L — SIGNIFICANT CHANGE UP (ref 5–17)
BUN SERPL-MCNC: 19 MG/DL — SIGNIFICANT CHANGE UP (ref 7–23)
CALCIUM SERPL-MCNC: 9.2 MG/DL — SIGNIFICANT CHANGE UP (ref 8.4–10.5)
CHLORIDE SERPL-SCNC: 104 MMOL/L — SIGNIFICANT CHANGE UP (ref 96–108)
CO2 SERPL-SCNC: 23 MMOL/L — SIGNIFICANT CHANGE UP (ref 22–31)
CREAT SERPL-MCNC: 0.65 MG/DL — SIGNIFICANT CHANGE UP (ref 0.5–1.3)
GLUCOSE SERPL-MCNC: 103 MG/DL — HIGH (ref 70–99)
HCT VFR BLD CALC: 37.3 % — LOW (ref 39–50)
HGB BLD-MCNC: 13.2 G/DL — SIGNIFICANT CHANGE UP (ref 13–17)
MCHC RBC-ENTMCNC: 35.4 GM/DL — SIGNIFICANT CHANGE UP (ref 32–36)
MCHC RBC-ENTMCNC: 35.4 PG — HIGH (ref 27–34)
MCV RBC AUTO: 100 FL — SIGNIFICANT CHANGE UP (ref 80–100)
PLATELET # BLD AUTO: 186 K/UL — SIGNIFICANT CHANGE UP (ref 150–400)
POTASSIUM SERPL-MCNC: 4.1 MMOL/L — SIGNIFICANT CHANGE UP (ref 3.5–5.3)
POTASSIUM SERPL-SCNC: 4.1 MMOL/L — SIGNIFICANT CHANGE UP (ref 3.5–5.3)
RBC # BLD: 3.73 M/UL — LOW (ref 4.2–5.8)
RBC # FLD: 11.5 % — SIGNIFICANT CHANGE UP (ref 10.3–14.5)
SODIUM SERPL-SCNC: 140 MMOL/L — SIGNIFICANT CHANGE UP (ref 135–145)
WBC # BLD: 6.9 K/UL — SIGNIFICANT CHANGE UP (ref 3.8–10.5)
WBC # FLD AUTO: 6.9 K/UL — SIGNIFICANT CHANGE UP (ref 3.8–10.5)

## 2017-08-12 PROCEDURE — 99233 SBSQ HOSP IP/OBS HIGH 50: CPT

## 2017-08-12 PROCEDURE — 70548 MR ANGIOGRAPHY NECK W/DYE: CPT | Mod: 26

## 2017-08-12 PROCEDURE — 70553 MRI BRAIN STEM W/O & W/DYE: CPT | Mod: 26

## 2017-08-12 RX ADMIN — ATORVASTATIN CALCIUM 40 MILLIGRAM(S): 80 TABLET, FILM COATED ORAL at 21:19

## 2017-08-12 RX ADMIN — POLYETHYLENE GLYCOL 3350 17 GRAM(S): 17 POWDER, FOR SOLUTION ORAL at 13:04

## 2017-08-12 RX ADMIN — ENOXAPARIN SODIUM 40 MILLIGRAM(S): 100 INJECTION SUBCUTANEOUS at 17:35

## 2017-08-12 RX ADMIN — OXYCODONE AND ACETAMINOPHEN 1 TABLET(S): 5; 325 TABLET ORAL at 17:35

## 2017-08-12 RX ADMIN — Medication 100 MILLIGRAM(S): at 13:03

## 2017-08-12 RX ADMIN — Medication 100 MILLIGRAM(S): at 21:19

## 2017-08-12 RX ADMIN — Medication 100 MILLIGRAM(S): at 06:20

## 2017-08-12 RX ADMIN — OXYCODONE AND ACETAMINOPHEN 1 TABLET(S): 5; 325 TABLET ORAL at 04:55

## 2017-08-12 RX ADMIN — OXYCODONE AND ACETAMINOPHEN 1 TABLET(S): 5; 325 TABLET ORAL at 18:05

## 2017-08-12 RX ADMIN — OXYCODONE AND ACETAMINOPHEN 1 TABLET(S): 5; 325 TABLET ORAL at 04:21

## 2017-08-12 RX ADMIN — OXYCODONE AND ACETAMINOPHEN 1 TABLET(S): 5; 325 TABLET ORAL at 23:46

## 2017-08-12 NOTE — PROGRESS NOTE ADULT - ASSESSMENT
ASSESSMENT: 72 years old man with vascular risk factors of age and hyperlipidemia is evaluated at [facility] for acute onset of headache and left sided weakness since 8/8/17. CT brain on admission showed right tempo-parieto-occipital ICH with surrounding vasogenic edema leading to mass effect and bring compression. He subsequently underwent craniectomy and hematoma evacuation. Impression:  Nontraumatic right hemispheric cortically located ICH. Right tempo-parieto-occipital ICH - likely etiology being possible cerebral amyloid angiopathy versus hemorrhagic transformation of ischemic stroke, favoring the former. Associated vasogenic edema leading to mass effect and brain compression      NEURO: Continue close monitoring for neurologic deterioration, s/p subgaleal drain removal (08/10), s/p Craniotomy for evacuation of hematoma  08/08, SBP <140 with titration to normotensive, no need for statin due to ICH, plan for MRI Brain w/o, MRA Head w/o and Neck w/contrast. Physical therapy/OT eval with [facility], continue current rehab treatment.     ANTITHROMBOTIC THERAPY: none due to ICH    PULMONARY: s/p extubation 08/09, protecting airway, saturating well     CARDIOVASCULAR:TTE unremarkable EF 65%, cardiac monitoring no events                            GASTROINTESTINAL:  dysphagia screen passed, tolerating diet     Diet: Regular    RENAL: BUN/Cr stable, good urine output      Na Goal: Greater than 135     Brice: N    HEMATOLOGY: H/H 13.2/37.3 Platelets normal      DVT ppx: LMWH     ID: afebrile, no leukocytosis     OTHER:     DISPOSITION: D/C to [facility] as per PMR eval once stable and workup is complete    CORE MEASURES:        Admission NIHSS: 12     TPA:  NO      LDL/HDL: 33/44     Depression Screen: P     Statin Therapy: y     Dysphagia Screen: PASS     Smoking  NO      Afib NO     [employer] Education YES ASSESSMENT: 72 years old man with vascular risk factors of age and hyperlipidemia is evaluated at [facility] for acute onset of headache and left sided weakness since 8/8/17. CT brain on admission showed right tempo-parieto-occipital ICH with surrounding vasogenic edema leading to mass effect and bring compression. He subsequently underwent craniectomy and hematoma evacuation. Impression:  Nontraumatic right hemispheric cortically located ICH. Right tempo-parieto-occipital ICH - likely etiology being possible cerebral amyloid angiopathy versus hemorrhagic transformation of ischemic stroke, favoring the former. Associated vasogenic edema leading to mass effect and brain compression      NEURO: Continue close monitoring for neurologic deterioration, s/p subgaleal drain removal (08/10), s/p Craniotomy for evacuation of hematoma  08/08, SBP <140 with titration to normotensive, no need for statin due to ICH, pending MRI Brain w/o, MRA Head w/o and Neck w/contrast. Plan/goals of care discussed with pt and pt's family at bedside.  Physical therapy/OT eval with AR, continue current rehab treatment.     ANTITHROMBOTIC THERAPY: none due to ICH    PULMONARY: s/p extubation 08/09, protecting airway, saturating well     CARDIOVASCULAR:TTE unremarkable EF 65%, cardiac monitoring no events                            GASTROINTESTINAL:  dysphagia screen passed, tolerating diet     Diet: Regular    RENAL: BUN/Cr stable, good urine output      Na Goal: Greater than 135     Brice: N    HEMATOLOGY: H/H 13.2/37.3 Platelets normal      DVT ppx: LMWH     ID: afebrile, no leukocytosis     OTHER:     DISPOSITION: D/C to [facility] as per PMR eval once stable and workup is complete    CORE MEASURES:        Admission NIHSS: 12     TPA:  NO      LDL/HDL: 33/44     Depression Screen: P     Statin Therapy: y     Dysphagia Screen: PASS     Smoking  NO      Afib NO     [employer] Education YES ASSESSMENT:   72 years old man with vascular risk factors of age and hyperlipidemia is evaluated at [facility] for acute onset of headache and left sided weakness since 8/8/17. CT brain on admission showed right tempo-parieto-occipital ICH with surrounding vasogenic edema leading to mass effect and brain compression. He subsequently underwent craniectomy and hematoma evacuation.     Impression:  Nontraumatic right hemispheric cortically located ICH. Right tempo-parieto-occipital ICH - likely etiology being possible cerebral amyloid angiopathy versus hemorrhagic transformation of ischemic stroke, favoring the former.   Associated vasogenic edema leading to mass effect and brain compression - status post hematoma evacuation      NEURO: Continue close monitoring for neurologic deterioration, s/p subgaleal drain removal (08/10), s/p Craniotomy for evacuation of hematoma 8/8, blood pressure goal - gradual normotension, no indications for statin from stroke prevention standpoint unless it's a home medication, pending MRI Brain w/o, MRA Head w/o and Neck w/contrast. Plan/goals of care discussed with pt and pt's family at bedside. Physical therapy/OT eval with AR, continue current rehab treatment.     ANTITHROMBOTIC THERAPY: None due to ICH and no specific indications at this time    PULMONARY: S/p extubation 8/9, protecting airway, saturating well     CARDIOVASCULAR:TTE unremarkable EF 65%, cardiac monitoring no events                            GASTROINTESTINAL:  Dysphagia screen passed, tolerating diet     Diet: Regular    RENAL: BUN/Cr stable, good urine output      Na Goal: Greater than 135     Brice: N    HEMATOLOGY: H/H 13.2/37.3 Platelets without any acute change      DVT ppx: LMWH     ID: Afebrile, no leukocytosis     OTHER: Plan of care discussed with patient and available family members at bedside. All the questions were answered and concerns were addressed.    DISPOSITION: D/C to [facility] as per PMR eval once stable and workup is complete    CORE MEASURES:        Admission NIHSS: 12     TPA:  NO      LDL/HDL: 33/44     Depression Screen: P     Statin Therapy: y     Dysphagia Screen: PASS     Smoking  NO      Afib NO     [employer] Education YES

## 2017-08-12 NOTE — PROGRESS NOTE ADULT - SUBJECTIVE AND OBJECTIVE BOX
Patient is a 72y old  Male who presents with a chief complaint of Headache (10 Aug 2017 09:20)  found to have ICH    INTERVAL HPI/OVERNIGHT EVENTS: Patient transferred to [address] [place] [address]    MEDICATIONS  (STANDING):  docusate sodium 100 milliGRAM(s) Oral three times a day  atorvastatin 40 milliGRAM(s) Oral at bedtime  polyethylene glycol 3350 17 Gram(s) Oral daily  enoxaparin Injectable 40 milliGRAM(s) SubCutaneous <User Schedule>    MEDICATIONS  (PRN):  acetaminophen    Suspension. 650 milliGRAM(s) Oral every 6 hours PRN Mild Pain (1 - 3)  ondansetron Injectable 4 milliGRAM(s) IV Push every 6 hours PRN Nausea and/or Vomiting  oxyCODONE    5 mG/acetaminophen 325 mG 1 Tablet(s) Oral every 4 hours PRN Severe Pain (7 - 10)  acetaminophen   Tablet 650 milliGRAM(s) Oral every 6 hours PRN For Temp greater than 38 C (100.4 F)  pa      Orders last 24 hours:  MRI Head w/wo Cont: Routine   Indication: r/o  Transport: [facility],  w/ Monitor  Provider's Contact #: [phone] (08-11-17 @ 09:09)  MRA Head w/wo Cont: Routine   Indication: r/o  Transport: [facility],  w/ Monitor  Provider's Contact #: [phone] (08-11-17 @ 09:09)  Complete Blood Count: STAT (08-12-17 @ 01:03)  Basic Metabolic Panel: STAT (08-12-17 @ 01:03)      Allergies    No Known Allergies    Intolerances        Patient arousable, confused.       PHYSICAL EXAM:  Vital Signs Last 24 Hrs  T(C): 37.1 (12 Aug 2017 04:00), Max: 37.3 (11 Aug 2017 20:00)  T(F): 98.8 (12 Aug 2017 04:00), Max: 99.1 (11 Aug 2017 20:00)  HR: 62 (12 Aug 2017 06:00) (55 - 85)  BP: 111/64 (12 Aug 2017 06:00) (108/95 - 153/86)  BP(mean): 80 (12 Aug 2017 06:00) (75 - 104)  RR: 16 (12 Aug 2017 06:00) (13 - 24)  SpO2: 95% (12 Aug 2017 06:00) (93% - 99%)    GENERAL: NAD, well-groomed, well-developed  HEAD:  Atraumatic, Normocephalic  EYES: EOMI, PERRLA, conjunctiva and sclera clear  NECK: Supple, No JVD, Normal thyroid  NERVOUS SYSTEM:  not moving left arm  HEST/LUNG: Clear to auscultation bilaterally; No rales, rhonchi, wheezing, or rubs  HEART: S1S2 regular, without murmur, rub nor gallop  ABDOMEN: Soft, Nontender, Nondistended; Bowel sounds present  EXTREMITIES:  2+ Peripheral Pulses, No clubbing, cyanosis, or edema      LABS:                        13.2   6.9   )-----------( 186      ( 12 Aug 2017 04:43 )             37.3     12 Aug 2017 04:43    140    |  104    |  19     ----------------------------<  103    4.1     |  23     |  0.65     Ca    9.2        12 Aug 2017 04:43              TELEMETRY:    RADIOLOGY & ADDITIONAL TESTS:  Patient is a 72y old  Male who presents with a chief complaint of Headache (10 Aug 2017 09:20)      INTERVAL HPI/OVERNIGHT EVENTS:    MEDICATIONS  (STANDING):  docusate sodium 100 milliGRAM(s) Oral three times a day  atorvastatin 40 milliGRAM(s) Oral at bedtime  polyethylene glycol 3350 17 Gram(s) Oral daily  enoxaparin Injectable 40 milliGRAM(s) SubCutaneous <User Schedule>    MEDICATIONS  (PRN):  acetaminophen    Suspension. 650 milliGRAM(s) Oral every 6 hours PRN Mild Pain (1 - 3)  ondansetron Injectable 4 milliGRAM(s) IV Push every 6 hours PRN Nausea and/or Vomiting  oxyCODONE    5 mG/acetaminophen 325 mG 1 Tablet(s) Oral every 4 hours PRN Severe Pain (7 - 10)  acetaminophen   Tablet 650 milliGRAM(s) Oral every 6 hours PRN For Temp greater than 38 C (100.4 F)        Orders last 24 hours:  MRI Head w/wo Cont: Routine   Indication: r/o  Transport: [facility],  w/ Monitor  Provider's Contact #: [phone] (08-11-17 @ 09:09)  MRA Head w/wo Cont: Routine   Indication: r/o  Transport: Stret[facility],  w/ Monitor  Provider's Contact #: [phone] (08-11-17 @ 09:09)  Complete Blood Count: STAT (08-12-17 @ 01:03)  Basic Metabolic Panel: STAT (08-12-17 @ 01:03)      Allergies    No Known Allergies    Intolerances        REVIEW OF SYSTEMS:  CARDIOVASCULAR: No chest pain, palpitations, dizziness, or leg swelling; no shortness of breath     RESPIRATORY: No cough, wheezing, chills or hemoptysis; No shortness of breath    GASTROINTESTINAL: No abdominal or epigastric pain. No nausea, vomiting, or hematemesis; No diarrhea or constipation. No melena or hematochezia.    NEUROLOGICAL: No headaches, memory loss, loss of strength, numbness      PHYSICAL EXAM:  Vital Signs Last 24 Hrs  T(C): 37.1 (12 Aug 2017 04:00), Max: 37.3 (11 Aug 2017 20:00)  T(F): 98.8 (12 Aug 2017 04:00), Max: 99.1 (11 Aug 2017 20:00)  HR: 62 (12 Aug 2017 06:00) (55 - 85)  BP: 111/64 (12 Aug 2017 06:00) (108/95 - 153/86)  BP(mean): 80 (12 Aug 2017 06:00) (75 - 104)  RR: 16 (12 Aug 2017 06:00) (13 - 24)  SpO2: 95% (12 Aug 2017 06:00) (93% - 99%)    GENERAL: NAD, well-groomed, well-developed  HEAD:  Atraumatic, Normocephalic  EYES: EOMI, PERRLA, conjunctiva and sclera clear  NECK: Supple, No JVD, Normal thyroid  NERVOUS SYSTEM:  Alert & Oriented X3, Good concentration;  and symmetric  CHEST/LUNG: Clear to auscultation bilaterally; No rales, rhonchi, wheezing, or rubs  HEART: S1S2 regular, without murmur, rub nor gallop  ABDOMEN: Soft, Nontender, Nondistended; Bowel sounds present  EXTREMITIES:  2+ Peripheral Pulses, No clubbing, cyanosis, or edema      LABS:                        13.2   6.9   )-----------( 186      ( 12 Aug 2017 04:43 )             37.3     12 Aug 2017 04:43    140    |  104    |  19     ----------------------------<  103    4.1     |  23     |  0.65     Ca    9.2        12 Aug 2017 04:43        CAPILLARY BLOOD GLUCOSE        Consultant(s) Notes Reviewed:        assessment:  s/p evacuation of ICH    Plan:   For MRI/MRA, neuro f/u    discussed withpt's nurse Patient is a 72y old  Male who presents with a chief complaint of Headache (10 Aug 2017 09:20)  found to have ICH    INTERVAL HPI/OVERNIGHT EVENTS: Patient transferred to [address] [place] [address]    MEDICATIONS  (STANDING):  docusate sodium 100 milliGRAM(s) Oral three times a day  atorvastatin 40 milliGRAM(s) Oral at bedtime  polyethylene glycol 3350 17 Gram(s) Oral daily  enoxaparin Injectable 40 milliGRAM(s) SubCutaneous <User Schedule>    MEDICATIONS  (PRN):  acetaminophen    Suspension. 650 milliGRAM(s) Oral every 6 hours PRN Mild Pain (1 - 3)  ondansetron Injectable 4 milliGRAM(s) IV Push every 6 hours PRN Nausea and/or Vomiting  oxyCODONE    5 mG/acetaminophen 325 mG 1 Tablet(s) Oral every 4 hours PRN Severe Pain (7 - 10)  acetaminophen   Tablet 650 milliGRAM(s) Oral every 6 hours PRN For Temp greater than 38 C (100.4 F)  pa      Orders last 24 hours:  MRI Head w/wo Cont: Routine   Indication: r/o  Transport: [facility],  w/ Monitor  Provider's Contact #: [phone] (08-11-17 @ 09:09)  MRA Head w/wo Cont: Routine   Indication: r/o  Transport: Stret[facility]Crib,  w/ Monitor  Provider's Contact #: [phone] (08-11-17 @ 09:09)  Complete Blood Count: STAT (08-12-17 @ 01:03)  Basic Metabolic Panel: STAT (08-12-17 @ 01:03)      Allergies    No Known Allergies    Intolerances        Patient arousable, confused.       PHYSICAL EXAM:  Vital Signs Last 24 Hrs  T(C): 37.1 (12 Aug 2017 04:00), Max: 37.3 (11 Aug 2017 20:00)  T(F): 98.8 (12 Aug 2017 04:00), Max: 99.1 (11 Aug 2017 20:00)  HR: 62 (12 Aug 2017 06:00) (55 - 85)  BP: 111/64 (12 Aug 2017 06:00) (108/95 - 153/86)  BP(mean): 80 (12 Aug 2017 06:00) (75 - 104)  RR: 16 (12 Aug 2017 06:00) (13 - 24)  SpO2: 95% (12 Aug 2017 06:00) (93% - 99%)    GENERAL: NAD, well-groomed, well-developed  HEAD:  Atraumatic, Normocephalic  EYES: EOMI, PERRLA, conjunctiva and sclera clear  NECK: Supple, No JVD, Normal thyroid  NERVOUS SYSTEM:  moves all extremities, left upper extremity weak  HEST/LUNG: Clear to auscultation bilaterally; No rales, rhonchi, wheezing, or rubs  HEART: S1S2 regular, without murmur, rub nor gallop  ABDOMEN: Soft, Nontender, Nondistended; Bowel sounds present  EXTREMITIES:  2+ Peripheral Pulses, No clubbing, cyanosis, or edema      LABS:                        13.2   6.9   )-----------( 186      ( 12 Aug 2017 04:43 )             37.3     12 Aug 2017 04:43    140    |  104    |  19     ----------------------------<  103    4.1     |  23     |  0.65     Ca    9.2        12 Aug 2017 04:43              TELEMETRY:    RADIOLOGY & ADDITIONAL TESTS:  CAPILLARY BLOOD GLUCOSE        Consultant(s) Notes Reviewed:        assessment:  s/p evacuation of ICH    Plan:   For MRI/MRA, neuro f/u

## 2017-08-12 NOTE — PROGRESS NOTE ADULT - SUBJECTIVE AND OBJECTIVE BOX
THE PATIENT WAS SEEN AND EXAMINED BY ME WITH THE HOUSESTAFF AND STROKE TEAM DURING MORNING ROUNDS.   HPI:  73 y/o man w/ PMH of BPH, HLD, GERD, herniated cervical disc presenting with R sided headache. Last known normal was before he went to bed the night prior to of admission.  Pt awoke early in the morning at around 2 AM with a severe throbbing headache that behind his R eye that radiates to R occiput and down into his R neck. The pain has not spread, gotten more severe, or radiated anywhere since HA began. Pt denies any dysarthria, dysphagia, or visual changes. PT does endorse nausea, photophobia, dizziness, and lightheadedness.   Per collateral from pts girlfriend, pt called her when he got the HA and felt like he couldn't walk. When she arrived to pick him up, he was speaking fine but seemed to be confused, unable to walk, or see some things that were in front of him.      SUBJECTIVE: No events overnight.  No new neurologic complaints.      acetaminophen    Suspension. 650 milliGRAM(s) Oral every 6 hours PRN  ondansetron Injectable 4 milliGRAM(s) IV Push every 6 hours PRN  docusate sodium 100 milliGRAM(s) Oral three times a day  oxyCODONE    5 mG/acetaminophen 325 mG 1 Tablet(s) Oral every 4 hours PRN  acetaminophen   Tablet 650 milliGRAM(s) Oral every 6 hours PRN  atorvastatin 40 milliGRAM(s) Oral at bedtime  polyethylene glycol 3350 17 Gram(s) Oral daily  enoxaparin Injectable 40 milliGRAM(s) SubCutaneous <User Schedule>      PHYSICAL EXAM:   Vital Signs Last 24 Hrs  T(C): 36.8 (12 Aug 2017 08:00), Max: 37.3 (11 Aug 2017 20:00)  T(F): 98.2 (12 Aug 2017 08:00), Max: 99.1 (11 Aug 2017 20:00)  HR: 70 (12 Aug 2017 08:00) (55 - 85)  BP: 119/68 (12 Aug 2017 08:00) (108/95 - 153/86)  BP(mean): 80 (12 Aug 2017 06:00) (75 - 104)  RR: 15 (12 Aug 2017 08:00) (13 - 24)  SpO2: 95% (12 Aug 2017 08:00) (93% - 98%)    General: No acute distress  HEENT: EOM intact, visual fields full  Abdomen: Soft, nontender, nondistended   Extremities: No edema    NEUROLOGICAL EXAM:  Mental status: Awake, alert, oriented x3, able to generate short sentences, left motor neglect to dual stimulation   Cranial Nerves: left nasolabial flattening, LHH, no nystagmus, no dysarthria, .  Motor exam: Normal tone, no drift, RUE 5/5, RLE 5/5, left hemiparesis LUE 3/5, LLE 4/5,   Sensation:  Left side motor neglect   Coordination/ Gait: No dysmetria,          LABS:                        13.2   6.9   )-----------( 186      ( 12 Aug 2017 04:43 )             37.3    08-12    140  |  104  |  19  ----------------------------<  103<H>  4.1   |  23  |  0.65    Ca    9.2      12 Aug 2017 04:43  Phos  2.6     08-10  Mg     2.1     08-10      Hemoglobin A1C, Whole Blood: 5.5 % (08-09 @ 22:36)  Hemoglobin A1C, Whole Blood: 5.4 % (08-09 @ 10:42)      IMAGING: Reviewed by me.     Head CT (08/08) Severely motion degraded CT scan. There is limited visualization of a large acute intraparenchymal hemorrhage centered in the right temporal-occipital  region that measures approximately 6 x 3.5 x 4 cm. Mild surrounding vasogenic edema and mass effect. No evidence of brain herniation or hydrocephalus hydrocephalus. The hemorrhage may be due to hypertensive bleed or hemorrhagic transformation of an infarct.   head CT (08/09) Interval right parietal craniotomy and evacuation of right posterior parietal, occipital, and temporal parenchymal hemorrhage. Scattered residual parenchymal hemorrhage in the subjacent surgical bed. Residual edema surrounding the surgical bed. Resolution of leftward midline shift. No effacement of the basal cisterns or hydrocephalus. THE PATIENT WAS SEEN AND EXAMINED BY ME WITH THE HOUSESTAFF AND STROKE TEAM DURING MORNING ROUNDS.   HPI:  71 y/o man w/ PMH of BPH, HLD, GERD, herniated cervical disc presenting with R sided headache. Last known normal was before he went to bed the night prior to of admission.  Pt awoke early in the morning at around 2 AM with a severe throbbing headache that behind his R eye that radiates to R occiput and down into his R neck. The pain has not spread, gotten more severe, or radiated anywhere since HA began. Pt denies any dysarthria, dysphagia, or visual changes. PT does endorse nausea, photophobia, dizziness, and lightheadedness.   Per collateral from pts girlfriend, pt called her when he got the HA and felt like he couldn't walk. When she arrived to pick him up, he was speaking fine but seemed to be confused, unable to walk, or see some things that were in front of him.      SUBJECTIVE: c/o mild headache.  No new neurologic complaints.      acetaminophen    Suspension. 650 milliGRAM(s) Oral every 6 hours PRN  ondansetron Injectable 4 milliGRAM(s) IV Push every 6 hours PRN  docusate sodium 100 milliGRAM(s) Oral three times a day  oxyCODONE    5 mG/acetaminophen 325 mG 1 Tablet(s) Oral every 4 hours PRN  acetaminophen   Tablet 650 milliGRAM(s) Oral every 6 hours PRN  atorvastatin 40 milliGRAM(s) Oral at bedtime  polyethylene glycol 3350 17 Gram(s) Oral daily  enoxaparin Injectable 40 milliGRAM(s) SubCutaneous <User Schedule>      PHYSICAL EXAM:   Vital Signs Last 24 Hrs  T(C): 36.8 (12 Aug 2017 08:00), Max: 37.3 (11 Aug 2017 20:00)  T(F): 98.2 (12 Aug 2017 08:00), Max: 99.1 (11 Aug 2017 20:00)  HR: 70 (12 Aug 2017 08:00) (55 - 85)  BP: 119/68 (12 Aug 2017 08:00) (108/95 - 153/86)  BP(mean): 80 (12 Aug 2017 06:00) (75 - 104)  RR: 15 (12 Aug 2017 08:00) (13 - 24)  SpO2: 95% (12 Aug 2017 08:00) (93% - 98%)    General: No acute distress  HEENT: EOM intact,  LHH,  Abdomen: Soft, nontender, nondistended   Extremities: No edema    NEUROLOGICAL EXAM:  Mental status: Eyes open, alert, oriented x3, fluent speech, left motor neglect to dual stimulation, able to follow commands, able to ID objects   Cranial Nerves: Left facial droop, LHH, no nystagmus, no dysarthria, .  Motor exam: Normal tone, no drift, RUE 5/5, RLE 5/5, left hemiparesis LUE drift prox/distally3/5, LLE 4/5  Sensation:  Left side sensory neglect   Coordination/ Gait: No dysmetria,          LABS:                        13.2   6.9   )-----------( 186      ( 12 Aug 2017 04:43 )             37.3    08-12    140  |  104  |  19  ----------------------------<  103<H>  4.1   |  23  |  0.65    Ca    9.2      12 Aug 2017 04:43  Phos  2.6     08-10  Mg     2.1     08-10      Hemoglobin A1C, Whole Blood: 5.5 % (08-09 @ 22:36)  Hemoglobin A1C, Whole Blood: 5.4 % (08-09 @ 10:42)      IMAGING: Reviewed by me.     Head CT (08/08) Severely motion degraded CT scan. There is limited visualization of a large acute intraparenchymal hemorrhage centered in the right temporal-occipital  region that measures approximately 6 x 3.5 x 4 cm. Mild surrounding vasogenic edema and mass effect. No evidence of brain herniation or hydrocephalus hydrocephalus. The hemorrhage may be due to hypertensive bleed or hemorrhagic transformation of an infarct.   head CT (08/09) Interval right parietal craniotomy and evacuation of right posterior parietal, occipital, and temporal parenchymal hemorrhage. Scattered residual parenchymal hemorrhage in the subjacent surgical bed. Residual edema surrounding the surgical bed. Resolution of leftward midline shift. No effacement of the basal cisterns or hydrocephalus. THE PATIENT WAS SEEN AND EXAMINED BY ME WITH THE HOUSESTAFF AND STROKE TEAM DURING MORNING ROUNDS.     HPI:  73 y/o man w/ PMH of BPH, HLD, GERD, herniated cervical disc presenting with R sided headache. Last known normal was before he went to bed the night prior to of admission.  Pt awoke early in the morning at around 2 AM with a severe throbbing headache that behind his R eye that radiates to R occiput and down into his R neck. The pain has not spread, gotten more severe, or radiated anywhere since HA began. Pt denies any dysarthria, dysphagia, or visual changes. PT does endorse nausea, photophobia, dizziness, and lightheadedness.   Per collateral from pts girlfriend, pt called her when he got the HA and felt like he couldn't walk. When she arrived to pick him up, he was speaking fine but seemed to be confused, unable to walk, or see some things that were in front of him.    SUBJECTIVE: c/o mild headache.  No new neurologic complaints.      acetaminophen    Suspension. 650 milliGRAM(s) Oral every 6 hours PRN  ondansetron Injectable 4 milliGRAM(s) IV Push every 6 hours PRN  docusate sodium 100 milliGRAM(s) Oral three times a day  oxyCODONE    5 mG/acetaminophen 325 mG 1 Tablet(s) Oral every 4 hours PRN  acetaminophen   Tablet 650 milliGRAM(s) Oral every 6 hours PRN  atorvastatin 40 milliGRAM(s) Oral at bedtime  polyethylene glycol 3350 17 Gram(s) Oral daily  enoxaparin Injectable 40 milliGRAM(s) SubCutaneous <User Schedule>      PHYSICAL EXAM:   Vital Signs Last 24 Hrs  T(C): 36.8 (12 Aug 2017 08:00), Max: 37.3 (11 Aug 2017 20:00)  T(F): 98.2 (12 Aug 2017 08:00), Max: 99.1 (11 Aug 2017 20:00)  HR: 70 (12 Aug 2017 08:00) (55 - 85)  BP: 119/68 (12 Aug 2017 08:00) (108/95 - 153/86)  BP(mean): 80 (12 Aug 2017 06:00) (75 - 104)  RR: 15 (12 Aug 2017 08:00) (13 - 24)  SpO2: 95% (12 Aug 2017 08:00) (93% - 98%)    General: No acute distress  HEENT: EOM intact,  LHH,  Abdomen: Soft, nontender, nondistended   Extremities: No edema    NEUROLOGICAL EXAM:  Mental status: Eyes open, alert, oriented x3, fluent speech, left motor neglect, able to follow commands, able to ID objects   Cranial Nerves: Left facial droop, LHH, no nystagmus, no dysarthria, right gaze preference which would be overcome across the midline with stimulation  Motor exam: Normal tone, no drift, RUE 5/5, RLE 5/5, left hemiparesis LUE drift prox/distally 4-/5, LLE 4/5  Sensation:  Left side sensory extension  Coordination/ Gait: No dysmetria         LABS:                        13.2   6.9   )-----------( 186      ( 12 Aug 2017 04:43 )             37.3    08-12    140  |  104  |  19  ----------------------------<  103<H>  4.1   |  23  |  0.65    Ca    9.2      12 Aug 2017 04:43  Phos  2.6     08-10  Mg     2.1     08-10      Hemoglobin A1C, Whole Blood: 5.5 % (08-09 @ 22:36)  Hemoglobin A1C, hole Blood: 5.4 % (08-09 @ 10:42)      IMAGING: Reviewed by me.     Head CT (08/08) Severely motion degraded CT scan. There is limited visualization of a large acute intraparenchymal hemorrhage centered in the right temporal-occipital  region that measures approximately 6 x 3.5 x 4 cm. Mild surrounding vasogenic edema and mass effect. No evidence of brain herniation or hydrocephalus hydrocephalus. The hemorrhage may be due to hypertensive bleed or hemorrhagic transformation of an infarct.   head CT (08/09) Interval right parietal craniotomy and evacuation of right posterior parietal, occipital, and temporal parenchymal hemorrhage. Scattered residual parenchymal hemorrhage in the subjacent surgical bed. Residual edema surrounding the surgical bed. Resolution of leftward midline shift. No effacement of the basal cisterns or hydrocephalus.

## 2017-08-13 LAB
ANION GAP SERPL CALC-SCNC: 13 MMOL/L — SIGNIFICANT CHANGE UP (ref 5–17)
BUN SERPL-MCNC: 19 MG/DL — SIGNIFICANT CHANGE UP (ref 7–23)
CALCIUM SERPL-MCNC: 9.4 MG/DL — SIGNIFICANT CHANGE UP (ref 8.4–10.5)
CHLORIDE SERPL-SCNC: 101 MMOL/L — SIGNIFICANT CHANGE UP (ref 96–108)
CO2 SERPL-SCNC: 25 MMOL/L — SIGNIFICANT CHANGE UP (ref 22–31)
CREAT SERPL-MCNC: 0.71 MG/DL — SIGNIFICANT CHANGE UP (ref 0.5–1.3)
GLUCOSE SERPL-MCNC: 95 MG/DL — SIGNIFICANT CHANGE UP (ref 70–99)
HCT VFR BLD CALC: 38.4 % — LOW (ref 39–50)
HGB BLD-MCNC: 13.2 G/DL — SIGNIFICANT CHANGE UP (ref 13–17)
MCHC RBC-ENTMCNC: 34.3 GM/DL — SIGNIFICANT CHANGE UP (ref 32–36)
MCHC RBC-ENTMCNC: 34.3 PG — HIGH (ref 27–34)
MCV RBC AUTO: 100 FL — SIGNIFICANT CHANGE UP (ref 80–100)
PLATELET # BLD AUTO: 203 K/UL — SIGNIFICANT CHANGE UP (ref 150–400)
POTASSIUM SERPL-MCNC: 4.6 MMOL/L — SIGNIFICANT CHANGE UP (ref 3.5–5.3)
POTASSIUM SERPL-SCNC: 4.6 MMOL/L — SIGNIFICANT CHANGE UP (ref 3.5–5.3)
RBC # BLD: 3.84 M/UL — LOW (ref 4.2–5.8)
RBC # FLD: 11.7 % — SIGNIFICANT CHANGE UP (ref 10.3–14.5)
SODIUM SERPL-SCNC: 139 MMOL/L — SIGNIFICANT CHANGE UP (ref 135–145)
WBC # BLD: 7.3 K/UL — SIGNIFICANT CHANGE UP (ref 3.8–10.5)
WBC # FLD AUTO: 7.3 K/UL — SIGNIFICANT CHANGE UP (ref 3.8–10.5)

## 2017-08-13 PROCEDURE — 99233 SBSQ HOSP IP/OBS HIGH 50: CPT

## 2017-08-13 RX ADMIN — OXYCODONE AND ACETAMINOPHEN 1 TABLET(S): 5; 325 TABLET ORAL at 06:55

## 2017-08-13 RX ADMIN — Medication 100 MILLIGRAM(S): at 22:01

## 2017-08-13 RX ADMIN — OXYCODONE AND ACETAMINOPHEN 1 TABLET(S): 5; 325 TABLET ORAL at 01:00

## 2017-08-13 RX ADMIN — Medication 100 MILLIGRAM(S): at 06:01

## 2017-08-13 RX ADMIN — Medication 100 MILLIGRAM(S): at 16:33

## 2017-08-13 RX ADMIN — ENOXAPARIN SODIUM 40 MILLIGRAM(S): 100 INJECTION SUBCUTANEOUS at 18:08

## 2017-08-13 RX ADMIN — OXYCODONE AND ACETAMINOPHEN 1 TABLET(S): 5; 325 TABLET ORAL at 06:01

## 2017-08-13 RX ADMIN — POLYETHYLENE GLYCOL 3350 17 GRAM(S): 17 POWDER, FOR SOLUTION ORAL at 16:33

## 2017-08-13 RX ADMIN — Medication 650 MILLIGRAM(S): at 16:51

## 2017-08-13 NOTE — PROGRESS NOTE ADULT - ASSESSMENT
72M s/p right crani for evacuation of IPH    Neurochecks q4  Continue stroke care per neuro, consult nsgy PRN

## 2017-08-13 NOTE — PROGRESS NOTE ADULT - SUBJECTIVE AND OBJECTIVE BOX
THE PATIENT WAS SEEN AND EXAMINED BY ME WITH THE HOUSESTAFF AND STROKE TEAM DURING MORNING ROUNDS.   HPI:  71 y/o man w/ PMH of BPH, HLD, GERD, herniated cervical disc presenting with R sided headache. Last known normal was before he went to bed the night prior to of admission.  Pt awoke early in the morning at around 2 AM with a severe throbbing headache that behind his R eye that radiates to R occiput and down into his R neck. The pain has not spread, gotten more severe, or radiated anywhere since HA began. Pt denies any dysarthria, dysphagia, or visual changes. PT does endorse nausea, photophobia, dizziness, and lightheadedness.   Per collateral from pts girlfriend, pt called her when he got the HA and felt like he couldn't walk. When she arrived to pick him up, he was speaking fine but seemed to be confused, unable to walk, or see some things that were in front of him.      SUBJECTIVE: No events overnight.  No new neurologic complaints.      acetaminophen    Suspension. 650 milliGRAM(s) Oral every 6 hours PRN  ondansetron Injectable 4 milliGRAM(s) IV Push every 6 hours PRN  docusate sodium 100 milliGRAM(s) Oral three times a day  oxyCODONE    5 mG/acetaminophen 325 mG 1 Tablet(s) Oral every 4 hours PRN  acetaminophen   Tablet 650 milliGRAM(s) Oral every 6 hours PRN  atorvastatin 40 milliGRAM(s) Oral at bedtime  polyethylene glycol 3350 17 Gram(s) Oral daily  enoxaparin Injectable 40 milliGRAM(s) SubCutaneous <User Schedule>      PHYSICAL EXAM:   Vital Signs Last 24 Hrs  T(C): 37.2 (13 Aug 2017 04:35), Max: 37.4 (12 Aug 2017 20:00)  T(F): 98.9 (13 Aug 2017 04:35), Max: 99.4 (12 Aug 2017 20:00)  HR: 65 (13 Aug 2017 04:35) (60 - 89)  BP: 117/73 (13 Aug 2017 04:35) (108/84 - 140/83)  BP(mean): 93 (12 Aug 2017 22:00) (82 - 98)  RR: 18 (13 Aug 2017 04:35) (13 - 23)  SpO2: 97% (13 Aug 2017 04:35) (94% - 97%)    General: No acute distress  HEENT: EOM intact,  LHH,  Abdomen: Soft, nontender, nondistended   Extremities: No edema    NEUROLOGICAL EXAM:  Mental status: Eyes open, alert, oriented x3, fluent speech, left motor neglect, able to follow commands, able to ID objects   Cranial Nerves: Left facial droop, LHH, no nystagmus, no dysarthria, right gaze preference which would be overcome across the midline with stimulation  Motor exam: Normal tone, no drift, RUE 5/5, RLE 5/5, left hemiparesis LUE drift prox/distally 4-/5, LLE 4/5  Sensation:  Left side sensory extension  Coordination/ Gait: No dysmetria  LABS:                        13.2   7.3   )-----------( 203      ( 13 Aug 2017 05:55 )             38.4    08-13    139  |  101  |  19  ----------------------------<  95  4.6   |  25  |  0.71    Ca    9.4      13 Aug 2017 05:55      Hemoglobin A1C, Whole Blood: 5.5 % (08-09 @ 22:36)  Hemoglobin A1C, Whole Blood: 5.4 % (08-09 @ 10:42)      IMAGING: Reviewed by me.     Head CT (08/08) Severely motion degraded CT scan. There is limited visualization of a large acute intraparenchymal hemorrhage centered in the right temporal-occipital  region that measures approximately 6 x 3.5 x 4 cm. Mild surrounding vasogenic edema and mass effect. No evidence of brain herniation or hydrocephalus hydrocephalus. The hemorrhage may be due to hypertensive bleed or hemorrhagic transformation of an infarct.   head CT (08/09) Interval right parietal craniotomy and evacuation of right posterior parietal, occipital, and temporal parenchymal hemorrhage. Scattered residual parenchymal hemorrhage in the subjacent surgical bed. Residual edema surrounding the surgical bed. Resolution of leftward midline shift. No effacement of the basal cisterns or hydrocephalus. THE PATIENT WAS SEEN AND EXAMINED BY ME WITH THE HOUSESTAFF AND STROKE TEAM DURING MORNING ROUNDS.   HPI:  73 y/o man w/ PMH of BPH, HLD, GERD, herniated cervical disc presenting with R sided headache. Last known normal was before he went to bed the night prior to of admission.  Pt awoke early in the morning at around 2 AM with a severe throbbing headache that behind his R eye that radiates to R occiput and down into his R neck. The pain has not spread, gotten more severe, or radiated anywhere since HA began. Pt denies any dysarthria, dysphagia, or visual changes. PT does endorse nausea, photophobia, dizziness, and lightheadedness.   Per collateral from pts girlfriend, pt called her when he got the HA and felt like he couldn't walk. When she arrived to pick him up, he was speaking fine but seemed to be confused, unable to walk, or see some things that were in front of him.      SUBJECTIVE: No events overnight.  No new neurologic complaints.      acetaminophen    Suspension. 650 milliGRAM(s) Oral every 6 hours PRN  ondansetron Injectable 4 milliGRAM(s) IV Push every 6 hours PRN  docusate sodium 100 milliGRAM(s) Oral three times a day  oxyCODONE    5 mG/acetaminophen 325 mG 1 Tablet(s) Oral every 4 hours PRN  acetaminophen   Tablet 650 milliGRAM(s) Oral every 6 hours PRN  polyethylene glycol 3350 17 Gram(s) Oral daily  enoxaparin Injectable 40 milliGRAM(s) SubCutaneous <User Schedule>      PHYSICAL EXAM:   Vital Signs Last 24 Hrs  T(C): 37.2 (13 Aug 2017 04:35), Max: 37.4 (12 Aug 2017 20:00)  T(F): 98.9 (13 Aug 2017 04:35), Max: 99.4 (12 Aug 2017 20:00)  HR: 65 (13 Aug 2017 04:35) (60 - 89)  BP: 117/73 (13 Aug 2017 04:35) (108/84 - 140/83)  BP(mean): 93 (12 Aug 2017 22:00) (82 - 98)  RR: 18 (13 Aug 2017 04:35) (13 - 23)  SpO2: 97% (13 Aug 2017 04:35) (94% - 97%)    General: No acute distress  HEENT: EOM intact,  LHH,  Abdomen: Soft, nontender, nondistended   Extremities: No edema    NEUROLOGICAL EXAM:  Mental status: Eyes open, alert, oriented x3, fluent speech, left motor neglect, able to follow commands, able to ID objects   Cranial Nerves: Left facial droop, LHH, no nystagmus, no dysarthria, right gaze preference which would be overcome across the midline with stimulation  Motor exam: Normal tone, no drift, RUE 5/5, RLE 5/5, left hemiparesis LUE drift prox/distally 4-/5, LLE 4/5  Sensation:  Left side sensory extension  Coordination/ Gait: No dysmetria  LABS:                        13.2   7.3   )-----------( 203      ( 13 Aug 2017 05:55 )             38.4    08-13    139  |  101  |  19  ----------------------------<  95  4.6   |  25  |  0.71    Ca    9.4      13 Aug 2017 05:55      Hemoglobin A1C, Whole Blood: 5.5 % (08-09 @ 22:36)  Hemoglobin A1C, Whole Blood: 5.4 % (08-09 @ 10:42)      IMAGING: Reviewed by me.     Head CT (08/08) Severely motion degraded CT scan. There is limited visualization of a large acute intraparenchymal hemorrhage centered in the right temporal-occipital  region that measures approximately 6 x 3.5 x 4 cm. Mild surrounding vasogenic edema and mass effect. No evidence of brain herniation or hydrocephalus hydrocephalus. The hemorrhage may be due to hypertensive bleed or hemorrhagic transformation of an infarct.   head CT (08/09) Interval right parietal craniotomy and evacuation of right posterior parietal, occipital, and temporal parenchymal hemorrhage. Scattered residual parenchymal hemorrhage in the subjacent surgical bed. Residual edema surrounding the surgical bed. Resolution of leftward midline shift. No effacement of the basal cisterns or hydrocephalus. THE PATIENT WAS SEEN AND EXAMINED BY ME WITH THE HOUSESTAFF AND STROKE TEAM DURING MORNING ROUNDS.     HPI:  73 y/o man w/ PMH of BPH, HLD, GERD, herniated cervical disc presenting with R sided headache. Last known normal was before he went to bed the night prior to of admission.  Pt awoke early in the morning at around 2 AM with a severe throbbing headache that behind his R eye that radiates to R occiput and down into his R neck. The pain has not spread, gotten more severe, or radiated anywhere since HA began. Pt denies any dysarthria, dysphagia, or visual changes. PT does endorse nausea, photophobia, dizziness, and lightheadedness.   Per collateral from pts girlfriend, pt called her when he got the HA and felt like he couldn't walk. When she arrived to pick him up, he was speaking fine but seemed to be confused, unable to walk, or see some things that were in front of him.    SUBJECTIVE: No events overnight.  No new neurologic complaints.      acetaminophen    Suspension. 650 milliGRAM(s) Oral every 6 hours PRN  ondansetron Injectable 4 milliGRAM(s) IV Push every 6 hours PRN  docusate sodium 100 milliGRAM(s) Oral three times a day  oxyCODONE    5 mG/acetaminophen 325 mG 1 Tablet(s) Oral every 4 hours PRN  acetaminophen   Tablet 650 milliGRAM(s) Oral every 6 hours PRN  polyethylene glycol 3350 17 Gram(s) Oral daily  enoxaparin Injectable 40 milliGRAM(s) SubCutaneous <User Schedule>      PHYSICAL EXAM:   Vital Signs Last 24 Hrs  T(C): 37.2 (13 Aug 2017 04:35), Max: 37.4 (12 Aug 2017 20:00)  T(F): 98.9 (13 Aug 2017 04:35), Max: 99.4 (12 Aug 2017 20:00)  HR: 65 (13 Aug 2017 04:35) (60 - 89)  BP: 117/73 (13 Aug 2017 04:35) (108/84 - 140/83)  BP(mean): 93 (12 Aug 2017 22:00) (82 - 98)  RR: 18 (13 Aug 2017 04:35) (13 - 23)  SpO2: 97% (13 Aug 2017 04:35) (94% - 97%)    General: No acute distress  HEENT: EOM intact,  LHH,  Abdomen: Soft, nontender, nondistended   Extremities: No edema    NEUROLOGICAL EXAM:  Mental status: Eyes open, alert, oriented x3, fluent speech, left motor neglect, able to follow commands, able to ID objects   Cranial Nerves: Left facial droop, LHH, no nystagmus, no dysarthria, right gaze preference which would be overcome across the midline with stimulation  Motor exam: Normal tone, no drift, RUE 5/5, RLE 5/5, left hemiparesis LUE drift prox/distally 4-/5, LLE 4/5  Sensation:  Left side sensory extension  Coordination/ Gait: No dysmetria    LABS:                        13.2   7.3   )-----------( 203      ( 13 Aug 2017 05:55 )             38.4    08-13    139  |  101  |  19  ----------------------------<  95  4.6   |  25  |  0.71    Ca    9.4      13 Aug 2017 05:55      Hemoglobin A1C, Whole Blood: 5.5 % (08-09 @ 22:36)  Hemoglobin A1C, Whole Blood: 5.4 % (08-09 @ 10:42)      IMAGING: Reviewed by me.     Head CT (08/08) Severely motion degraded CT scan. There is limited visualization of a large acute intraparenchymal hemorrhage centered in the right temporal-occipital  region that measures approximately 6 x 3.5 x 4 cm. Mild surrounding vasogenic edema and mass effect. No evidence of brain herniation or hydrocephalus hydrocephalus. The hemorrhage may be due to hypertensive bleed or hemorrhagic transformation of an infarct.   head CT (08/09) Interval right parietal craniotomy and evacuation of right posterior parietal, occipital, and temporal parenchymal hemorrhage. Scattered residual parenchymal hemorrhage in the subjacent surgical bed. Residual edema surrounding the surgical bed. Resolution of leftward midline shift. No effacement of the basal cisterns or hydrocephalus.

## 2017-08-13 NOTE — PROGRESS NOTE ADULT - SUBJECTIVE AND OBJECTIVE BOX
Patient is a 72y old  Male who presents with a chief complaint of Headache (10 Aug 2017 09:20)      INTERVAL HPI/OVERNIGHT EVENTS:    MEDICATIONS  (STANDING):  docusate sodium 100 milliGRAM(s) Oral three times a day  atorvastatin 40 milliGRAM(s) Oral at bedtime  polyethylene glycol 3350 17 Gram(s) Oral daily  enoxaparin Injectable 40 milliGRAM(s) SubCutaneous <User Schedule>    MEDICATIONS  (PRN):  acetaminophen    Suspension. 650 milliGRAM(s) Oral every 6 hours PRN Mild Pain (1 - 3)  ondansetron Injectable 4 milliGRAM(s) IV Push every 6 hours PRN Nausea and/or Vomiting  oxyCODONE    5 mG/acetaminophen 325 mG 1 Tablet(s) Oral every 4 hours PRN Severe Pain (7 - 10)  acetaminophen   Tablet 650 milliGRAM(s) Oral every 6 hours PRN For Temp greater than 38 C (100.4 F)              Allergies    No Known Allergies    Intolerances        REVIEW OF SYSTEMS:  CARDIOVASCULAR: No chest pain, palpitations, dizziness, or leg swelling; no shortness of breath     RESPIRATORY: No cough, wheezing, chills or hemoptysis; No shortness of breath    GASTROINTESTINAL: No abdominal or epigastric pain. No nausea, vomiting, or hematemesis; No diarrhea or constipation. No melena or hematochezia.    NEUROLOGICAL: No headaches, memory loss, loss of strength, numbness      PHYSICAL EXAM:  Vital Signs Last 24 Hrs  T(C): 36.4 (13 Aug 2017 07:25), Max: 37.4 (12 Aug 2017 20:00)  T(F): 97.5 (13 Aug 2017 07:25), Max: 99.4 (12 Aug 2017 20:00)  HR: 68 (13 Aug 2017 07:25) (60 - 89)  BP: 130/84 (13 Aug 2017 07:25) (108/84 - 140/83)  BP(mean): 93 (12 Aug 2017 22:00) (82 - 98)  RR: 20 (13 Aug 2017 07:25) (13 - 23)  SpO2: 95% (13 Aug 2017 07:25) (94% - 97%)    GENERAL: NAD, well-groomed, well-developed  HEAD:  Atraumatic, Normocephalic  EYES: EOMI, PERRLA, conjunctiva and sclera clear  NECK: Supple, No JVD, Normal thyroid  NERVOUS SYSTEM:  Alert & Oriented X3, Good concentration;  and symmetric  Still with left hemineglect   CHEST/LUNG: Clear to auscultation bilaterally; No rales, rhonchi, wheezing, or rubs  HEART: S1S2 regular, without murmur, rub nor gallop  ABDOMEN: Soft, Nontender, Nondistended; Bowel sounds present  EXTREMITIES:  2+ Peripheral Pulses, No clubbing, cyanosis, or edema      LABS:                        13.2   7.3   )-----------( 203      ( 13 Aug 2017 05:55 )             38.4     13 Aug 2017 05:55    139    |  101    |  19     ----------------------------<  95     4.6     |  25     |  0.71     Ca    9.4        13 Aug 2017 05:55                          assessment:  s/p ICH with evacuation.      Plan:   Awaiting MRI/MRA.  Can start statin; he was on this as outpatient.

## 2017-08-13 NOTE — PROGRESS NOTE ADULT - SUBJECTIVE AND OBJECTIVE BOX
Patient seen and examined at bedside.    Vital Signs Last 24 Hrs  T(C): 36.4 (13 Aug 2017 07:25), Max: 37.4 (12 Aug 2017 20:00)  T(F): 97.5 (13 Aug 2017 07:25), Max: 99.4 (12 Aug 2017 20:00)  HR: 68 (13 Aug 2017 07:25) (60 - 89)  BP: 130/84 (13 Aug 2017 07:25) (108/84 - 140/83)  BP(mean): 93 (12 Aug 2017 22:00) (82 - 98)  RR: 20 (13 Aug 2017 07:25) (13 - 23)  SpO2: 95% (13 Aug 2017 07:25) (94% - 97%)    Exam:   awake, oriented x3, FC, PERRL, EOMI, no facial  5/5 right side, LUE 4/5, LLE 4/5  SILT  no clonus  mild left facial  left visual neglect  left temporal hemianopsia

## 2017-08-13 NOTE — PROGRESS NOTE ADULT - ASSESSMENT
ASSESSMENT:   72 years old man with vascular risk factors of age and hyperlipidemia is evaluated at [facility] for acute onset of headache and left sided weakness since 8/8/17. CT brain on admission showed right tempo-parieto-occipital ICH with surrounding vasogenic edema leading to mass effect and brain compression. He subsequently underwent craniectomy and hematoma evacuation.     Impression:  Nontraumatic right hemispheric cortically located ICH. Right tempo-parieto-occipital ICH - likely etiology being possible cerebral amyloid angiopathy versus hemorrhagic transformation of ischemic stroke, favoring the former.   Associated vasogenic edema leading to mass effect and brain compression - status post hematoma evacuation      NEURO: Continue close monitoring for neurologic deterioration, s/p subgaleal drain removal (08/10), s/p Craniotomy for evacuation of hematoma 8/8, blood pressure goal - gradual normotension, no indications for statin from stroke prevention standpoint unless it's a home medication, pending official report of MRI Brain w/o, MRA Head w/o and Neck w/contrast. Plan/goals of care discussed with pt and pt's family at bedside. Physical therapy/OT eval with [facility], continue current rehab treatment.     ANTITHROMBOTIC THERAPY: None due to ICH and no specific indications at this time    PULMONARY: S/p extubation 8/9, protecting airway, saturating well     CARDIOVASCULAR:TTE unremarkable EF 65%, cardiac monitoring no events                            GASTROINTESTINAL:  Dysphagia screen passed, tolerating diet     Diet: Regular    RENAL: BUN/Cr stable, good urine output      Na Goal: Greater than 135     Brice: N    HEMATOLOGY: H/H 13.2/38.4 Platelets without any acute change      DVT ppx: LMWH     ID: Afebrile, no leukocytosis     OTHER: Plan of care discussed with patient and available family members at bedside. All the questions were answered and concerns were addressed.    DISPOSITION: D/C to [facility] as per PMR eval once stable and workup is complete    CORE MEASURES:        Admission NIHSS: 12     TPA:  NO      LDL/HDL: 33/44     Depression Screen: 0     Statin Therapy: y     Dysphagia Screen: PASS     Smoking  NO      Afib NO     [employer] YES ASSESSMENT:   72 years old man with vascular risk factors of age and hyperlipidemia is evaluated at [facility] for acute onset of headache and left sided weakness since 8/8/17. CT brain on admission showed right tempo-parieto-occipital ICH with surrounding vasogenic edema leading to mass effect and brain compression. He subsequently underwent craniectomy and hematoma evacuation.     Impression:  Nontraumatic right hemispheric cortically located ICH. Right tempo-parieto-occipital ICH - Brain MRI shows multiple micro-hemorrhages, stable right parieto-occipital ICH,  likely etiology being possible cerebral amyloid angiopathy  Associated vasogenic edema leading to mass effect and brain compression - status post hematoma evacuation      NEURO: Continue close monitoring for neurologic deterioration, s/p subgaleal drain removal (08/10), s/p Craniotomy for evacuation of hematoma 8/8, blood pressure goal - gradual normotension, no indications for statin from stroke prevention standpoint unless it's a home medication, pending official report of MRI Brain w/o, MRA Head w/o and Neck w/contrast. Plan/goals of care discussed with pt and pt's family at bedside. Physical therapy/OT eval with AR, continue current rehab treatment.     ANTITHROMBOTIC THERAPY: None due to ICH and no specific indications at this time    PULMONARY: S/p extubation 8/9, protecting airway, saturating well     CARDIOVASCULAR:TTE unremarkable EF 65%, cardiac monitoring no events                            GASTROINTESTINAL:  Dysphagia screen passed, tolerating diet     Diet: Regular    RENAL: BUN/Cr stable, good urine output      Na Goal: Greater than 135     Brice: N    HEMATOLOGY: H/H 13.2/38.4 Platelets without any acute change      DVT ppx: LMWH     ID: Afebrile, no leukocytosis     OTHER: Plan of care discussed with patient and available family members at bedside. All the questions were answered and concerns were addressed.    DISPOSITION: D/C to AR as per PMR eval once stable and workup is complete    CORE MEASURES:        Admission NIHSS: 12     TPA:  NO      LDL/HDL: 33/44     Depression Screen: 0     Statin Therapy: y     Dysphagia Screen: PASS     Smoking  NO      Afib NO     [employer] Education YES ASSESSMENT:   72 years old man with vascular risk factors of age and hyperlipidemia is evaluated at [facility] for acute onset of headache and left sided weakness since 8/8/17. CT brain on admission showed right tempo-parieto-occipital ICH with surrounding vasogenic edema leading to mass effect and brain compression. He subsequently underwent craniectomy and hematoma evacuation.     Impression:  Nontraumatic right hemispheric cortically located ICH. Right tempo-parieto-occipital ICH - Brain MRI shows multiple micro-hemorrhages, stable right parieto-occipital ICH,  likely etiology being possible cerebral amyloid angiopathy  Associated vasogenic edema leading to mass effect and brain compression - status post hematoma evacuation      NEURO: Continue close monitoring for neurologic deterioration, s/p subgaleal drain removal (08/10), s/p Craniotomy for evacuation of hematoma 8/8, blood pressure goal - gradual normotension, no indications for statin from stroke prevention standpoint unless pending official report of MRI Brain w/o, MRA Head w/o and Neck w/contrast. Plan/goals of care discussed with pt and pt's family at bedside. Physical therapy/OT eval with AR, continue current rehab treatment.     ANTITHROMBOTIC THERAPY: None due to ICH and no specific indications at this time    PULMONARY: S/p extubation 8/9, protecting airway, saturating well     CARDIOVASCULAR:TTE unremarkable EF 65%, cardiac monitoring no events                            GASTROINTESTINAL:  Dysphagia screen passed, tolerating diet     Diet: Regular    RENAL: BUN/Cr stable, good urine output      Na Goal: Greater than 135     Brice: N    HEMATOLOGY: H/H 13.2/38.4 Platelets without any acute change      DVT ppx: LMWH     ID: Afebrile, no leukocytosis     OTHER: Plan of care discussed with patient and available family members at bedside. All the questions were answered and concerns were addressed.    DISPOSITION: D/C to [facility] as per PMR eval once stable and workup is complete    CORE MEASURES:        Admission NIHSS: 12     TPA:  NO      LDL/HDL: 33/44     Depression Screen: 0     Statin Therapy: y     Dysphagia Screen: PASS     Smoking  NO      Afib NO     [employer] Education YES ASSESSMENT:   72 years old man with vascular risk factors of age and hyperlipidemia is evaluated at [facility] for acute onset of headache and left sided weakness since 8/8/17. CT brain on admission showed right tempo-parieto-occipital ICH with surrounding vasogenic edema leading to mass effect and brain compression. He subsequently underwent craniectomy and hematoma evacuation.     Impression:  Nontraumatic right hemispheric cortically located ICH. Right tempo-parieto-occipital ICH - Brain MRI shows multiple micro-hemorrhages, stable right parieto-occipital ICH,  likely etiology being possible cerebral amyloid angiopathy  Associated vasogenic edema leading to mass effect and brain compression - status post hematoma evacuation      NEURO: Continue close monitoring for neurologic deterioration, s/p subgaleal drain removal (08/10), s/p Craniotomy for evacuation of hematoma 8/8, blood pressure goal - gradual normotension, no indications for statin from stroke prevention standpoint unless pending official report of MRI Brain w/o, MRA Head w/o and Neck w/contrast. plan to obtain repeat brain neuroimaging ini 4 -6 weeks to eval ICH and r/o any underlying tissue pathology once ICH would be resolved. Plan/goals of care discussed with pt and pt's family at bedside. Physical therapy/OT eval with AR, continue current rehab treatment.     ANTITHROMBOTIC THERAPY: None due to ICH and no specific indications at this time    PULMONARY: S/p extubation 8/9, protecting airway, saturating well     CARDIOVASCULAR:TTE unremarkable EF 65%, cardiac monitoring no events                            GASTROINTESTINAL:  Dysphagia screen passed, tolerating diet     Diet: Regular    RENAL: BUN/Cr stable, good urine output      Na Goal: Greater than 135     Brice: N    HEMATOLOGY: H/H 13.2/38.4 Platelets without any acute change      DVT ppx: LMWH     ID: Afebrile, no leukocytosis     OTHER: Plan of care discussed with patient and available family members at bedside. All the questions were answered and concerns were addressed.    DISPOSITION: D/C to [facility] as per PMR eval once stable and workup is complete    CORE MEASURES:        Admission NIHSS: 12     TPA:  NO      LDL/HDL: 33/44     Depression Screen: 0     Statin Therapy: y     Dysphagia Screen: PASS     Smoking  NO      Afib NO     [employer] Education YES ASSESSMENT:   72 years old man with vascular risk factors of age and hyperlipidemia is evaluated at [facility] for acute onset of headache and left sided weakness since 8/8/17. CT brain on admission showed right tempo-parieto-occipital ICH with surrounding vasogenic edema leading to mass effect and brain compression. He subsequently underwent craniectomy and hematoma evacuation. MRI brain showed stable right parietal occipital ICH with surrounding vasogenic edema and minimal IVH as well as evidence of few microhemorrhages predominantly located in cortical/subcortical locations. MRA head and neck did not show any evidence of vascular malformation or significant intracranial or extracranial large vessel severe stenosis or occlusion to my eyes.    Impression:  Nontraumatic right hemispheric cortically located ICH. Right tempo-parieto-occipital ICH - likely etiology being possible cerebral amyloid angiopathy and less likely to be secondary to underlying tissue pathology  Associated vasogenic edema leading to mass effect and brain compression - status post hematoma evacuation    NEURO: Continue close monitoring for neurologic deterioration, s/p subgaleal drain removal (08/10), s/p Craniotomy for evacuation of hematoma 8/8, blood pressure goal - gradual normotension, no indications for statin from stroke prevention standpoint unless pending official report of MRI Brain w/o, MRA Head w/o and Neck w/contrast. plan to obtain repeat brain neuroimaging in 2-3 months to eval for underlying tissue pathology once ICH would be resorbed. Plan/goals of care discussed with pt and pt's family at bedside. Physical therapy/OT eval with AR, continue current rehab treatment.     ANTITHROMBOTIC THERAPY: None due to ICH and no specific indications at this time    PULMONARY: S/p extubation 8/9, protecting airway, saturating well     CARDIOVASCULAR: TTE unremarkable EF 65%, cardiac monitoring no events                            GASTROINTESTINAL:  Dysphagia screen passed, tolerating diet     Diet: Regular    RENAL: BUN/Cr stable, good urine output      Na Goal: Greater than 135     Brice: N    HEMATOLOGY: H/H 13.2/38.4 Platelets without any acute change      DVT ppx: LMWH     ID: Afebrile, no leukocytosis     OTHER: Plan of care discussed with patient and available family members at bedside. All the questions were answered and concerns were addressed.    DISPOSITION: D/C to AR as per PMR eval once stable and workup is complete    CORE MEASURES:        Admission NIHSS: 12     TPA:  NO      LDL/HDL: 33/44     Depression Screen: 0     Statin Therapy: y     Dysphagia Screen: PASS     Smoking  NO      Afib NO     [employer] Education YES ASSESSMENT:   72 years old man with vascular risk factors of age and hyperlipidemia is evaluated at [facility] for acute onset of headache and left sided weakness since 8/8/17. CT brain on admission showed right tempo-parieto-occipital ICH with surrounding vasogenic edema leading to mass effect and brain compression. He subsequently underwent craniectomy and hematoma evacuation. MRI brain showed stable right parietal occipital ICH with surrounding vasogenic edema and minimal IVH as well as evidence of few microhemorrhages predominantly located in cortical/subcortical locations and mild-to-moderate leukoaraiosis. MRA head and neck did not show any evidence of vascular malformation or significant intracranial or extracranial large vessel severe stenosis or occlusion to my eyes.    Impression:  Nontraumatic right hemispheric cortically located ICH. Right tempo-parieto-occipital ICH - likely etiology being possible cerebral amyloid angiopathy and less likely to be secondary to underlying tissue pathology  Associated vasogenic edema leading to mass effect and brain compression - status post hematoma evacuation    NEURO: Continue close monitoring for neurologic deterioration, s/p subgaleal drain removal (08/10), s/p Craniotomy for evacuation of hematoma 8/8, blood pressure goal - gradual normotension, no indications for statin from stroke prevention standpoint unless pending official report of MRI Brain w/o, MRA Head w/o and Neck w/contrast. plan to obtain repeat brain neuroimaging in 2-3 months to eval for underlying tissue pathology once ICH would be resorbed. Plan/goals of care discussed with pt and pt's family at bedside. Physical therapy/OT eval with AR, continue current rehab treatment.     ANTITHROMBOTIC THERAPY: None due to ICH and no specific indications at this time    PULMONARY: S/p extubation 8/9, protecting airway, saturating well     CARDIOVASCULAR: TTE unremarkable EF 65%, cardiac monitoring no events                            GASTROINTESTINAL:  Dysphagia screen passed, tolerating diet     Diet: Regular    RENAL: BUN/Cr stable, good urine output      Na Goal: Greater than 135     Brice: N    HEMATOLOGY: H/H 13.2/38.4 Platelets without any acute change      DVT ppx: LMWH     ID: Afebrile, no leukocytosis     OTHER: Plan of care discussed with patient and available family members at bedside. All the questions were answered and concerns were addressed.    DISPOSITION: D/C to AR as per PMR eval once stable and workup is complete    CORE MEASURES:        Admission NIHSS: 12     TPA:  NO      LDL/HDL: 33/44     Depression Screen: 0     Statin Therapy: y     Dysphagia Screen: PASS     Smoking  NO      Afib NO     [employer] Education YES

## 2017-08-14 VITALS
SYSTOLIC BLOOD PRESSURE: 127 MMHG | TEMPERATURE: 99 F | HEART RATE: 76 BPM | DIASTOLIC BLOOD PRESSURE: 77 MMHG | RESPIRATION RATE: 19 BRPM | OXYGEN SATURATION: 95 %

## 2017-08-14 PROCEDURE — 99233 SBSQ HOSP IP/OBS HIGH 50: CPT

## 2017-08-14 PROCEDURE — 99232 SBSQ HOSP IP/OBS MODERATE 35: CPT

## 2017-08-14 RX ORDER — ACETAMINOPHEN 500 MG
2 TABLET ORAL
Qty: 0 | Refills: 0 | DISCHARGE
Start: 2017-08-14

## 2017-08-14 RX ORDER — ACETAMINOPHEN 500 MG
20.31 TABLET ORAL
Qty: 0 | Refills: 0 | DISCHARGE
Start: 2017-08-14

## 2017-08-14 RX ORDER — DOCUSATE SODIUM 100 MG
1 CAPSULE ORAL
Qty: 0 | Refills: 0 | DISCHARGE
Start: 2017-08-14

## 2017-08-14 RX ORDER — POLYETHYLENE GLYCOL 3350 17 G/17G
17 POWDER, FOR SOLUTION ORAL
Qty: 0 | Refills: 0 | DISCHARGE
Start: 2017-08-14

## 2017-08-14 RX ADMIN — Medication 650 MILLIGRAM(S): at 01:50

## 2017-08-14 RX ADMIN — Medication 100 MILLIGRAM(S): at 11:21

## 2017-08-14 RX ADMIN — Medication 650 MILLIGRAM(S): at 10:00

## 2017-08-14 RX ADMIN — POLYETHYLENE GLYCOL 3350 17 GRAM(S): 17 POWDER, FOR SOLUTION ORAL at 11:21

## 2017-08-14 RX ADMIN — Medication 650 MILLIGRAM(S): at 09:35

## 2017-08-14 RX ADMIN — Medication 100 MILLIGRAM(S): at 05:15

## 2017-08-14 NOTE — PROGRESS NOTE ADULT - SUBJECTIVE AND OBJECTIVE BOX
THE PATIENT WAS SEEN AND EXAMINED BY ME WITH THE HOUSESTAFF AND STROKE TEAM DURING MORNING ROUNDS.     HPI:73 y/o man w/ PMH of BPH, HLD, GERD, herniated cervical disc presenting with R sided headache. Last known normal was before he went to bed the night prior to of admission.  Pt awoke early in the morning at around 2 AM with a severe throbbing headache that behind his R eye that radiates to R occiput and down into his R neck. The pain has not spread, gotten more severe, or radiated anywhere since HA began. Per collateral from pts girlfriend, pt called her when he got the HA and felt like he couldn't walk. When she arrived to pick him up, he was speaking fine but seemed to be confused, unable to walk, or see some things that were in front of him. Now on stroke service after neurosurgical intervention for further care.     SUBJECTIVE: No events overnight.  No new neurologic complaints.      acetaminophen    Suspension. 650 milliGRAM(s) Oral every 6 hours PRN  ondansetron Injectable 4 milliGRAM(s) IV Push every 6 hours PRN  docusate sodium 100 milliGRAM(s) Oral three times a day  oxyCODONE    5 mG/acetaminophen 325 mG 1 Tablet(s) Oral every 4 hours PRN  acetaminophen   Tablet 650 milliGRAM(s) Oral every 6 hours PRN  polyethylene glycol 3350 17 Gram(s) Oral daily  enoxaparin Injectable 40 milliGRAM(s) SubCutaneous <User Schedule>  bisacodyl 5 milliGRAM(s) Oral every 12 hours PRN      PHYSICAL EXAM:   Vital Signs Last 24 Hrs  T(C): 37 (14 Aug 2017 08:18), Max: 37.2 (13 Aug 2017 19:55)  T(F): 98.6 (14 Aug 2017 08:18), Max: 99 (13 Aug 2017 19:55)  HR: 66 (14 Aug 2017 08:18) (66 - 76)  BP: 127/78 (14 Aug 2017 08:18) (103/59 - 131/72)  BP(mean): --  RR: 19 (14 Aug 2017 08:18) (18 - 20)  SpO2: 95% (14 Aug 2017 08:18) (94% - 95%)    General: No acute distress  HEENT: EOM intact,  LHH,  Abdomen: Soft, nontender, nondistended   Extremities: No edema    NEUROLOGICAL EXAM:  Mental status: Eyes open, alert, oriented x3, fluent speech, left motor neglect, able to follow commands, able to ID objects   Cranial Nerves: Left facial droop, LHH, no nystagmus, no dysarthria, left gaze palsy which would be overcome across the midline with stimulation  Motor exam: RUE 5/5, RLE 5/5, left hemiparesis with component of sensory and motor neglect: LUE 5/5 and LLE 3/5 with drift   Sensation:  Left side sensory extension  Coordination/ Gait: No dysmetria    LABS:                        13.2   7.3   )-----------( 203      ( 13 Aug 2017 05:55 )             38.4    08-13    139  |  101  |  19  ----------------------------<  95  4.6   |  25  |  0.71    Ca    9.4      13 Aug 2017 05:55      Hemoglobin A1C, Whole Blood: 5.5 % (08-09 @ 22:36)  Hemoglobin A1C, Whole Blood: 5.4 % (08-09 @ 10:42)      IMAGING: Reviewed by me.     Head CT (08/08) Severely motion degraded CT scan. There is limited visualization of a large acute intraparenchymal hemorrhage centered in the right temporal-occipital  region that measures approximately 6 x 3.5 x 4 cm. Mild surrounding vasogenic edema and mass effect. No evidence of brain herniation or hydrocephalus hydrocephalus. The hemorrhage may be due to hypertensive bleed or hemorrhagic transformation of an infarct.   head CT (08/09) Interval right parietal craniotomy and evacuation of right posterior parietal, occipital, and temporal parenchymal hemorrhage. Scattered residual parenchymal hemorrhage in the subjacent surgical bed. Residual edema surrounding the surgical bed. Resolution of leftward midline shift. No effacement of the basal cisterns or hydrocephalus.  MRA Neck w/Cont (08.12.17)  Normal MRA of the neck.  MRA Head w/Cont (08.12.17)   Poorly visualized distal branches of the left middle   cerebral artery. CT angiography may be helpful for further evaluation.  Azygos anterior cerebral artery and fetal origin of the posterior   cerebral arteries, normal anatomic variants.  No abnormal vessels identified to suggest a vascular malformation.  MRI Head w/Cont (08.12.17)   Focus of hemorrhage within the right posterior cerebral   hemisphere with surrounding edema with stable mass effect when compared   with the prior CT scan and mild leftward midline shift. Enhancement in   this area may be postoperative in nature or secondary to subacute   infarction although the possibility of an underlying lesion is not   entirely excluded and continued follow-up is advised. THE PATIENT WAS SEEN AND EXAMINED BY ME WITH THE HOUSESTAFF AND STROKE TEAM DURING MORNING ROUNDS.     HPI:71 y/o man w/ PMH of BPH, HLD, GERD, herniated cervical disc presenting with R sided headache. Last known normal was before he went to bed the night prior to of admission.  Pt awoke early in the morning at around 2 AM with a severe throbbing headache that behind his R eye that radiates to R occiput and down into his R neck. The pain has not spread, gotten more severe, or radiated anywhere since HA began. Per collateral from pts girlfriend, pt called her when he got the HA and felt like he couldn't walk. When she arrived to pick him up, he was speaking fine but seemed to be confused, unable to walk, or see some things that were in front of him. Now on stroke service after neurosurgical intervention for further care.     SUBJECTIVE: No events overnight.  No new neurologic complaints.      acetaminophen    Suspension. 650 milliGRAM(s) Oral every 6 hours PRN  ondansetron Injectable 4 milliGRAM(s) IV Push every 6 hours PRN  docusate sodium 100 milliGRAM(s) Oral three times a day  oxyCODONE    5 mG/acetaminophen 325 mG 1 Tablet(s) Oral every 4 hours PRN  acetaminophen   Tablet 650 milliGRAM(s) Oral every 6 hours PRN  polyethylene glycol 3350 17 Gram(s) Oral daily  enoxaparin Injectable 40 milliGRAM(s) SubCutaneous <User Schedule>  bisacodyl 5 milliGRAM(s) Oral every 12 hours PRN      PHYSICAL EXAM:   Vital Signs Last 24 Hrs  T(C): 37 (14 Aug 2017 08:18), Max: 37.2 (13 Aug 2017 19:55)  T(F): 98.6 (14 Aug 2017 08:18), Max: 99 (13 Aug 2017 19:55)  HR: 66 (14 Aug 2017 08:18) (66 - 76)  BP: 127/78 (14 Aug 2017 08:18) (103/59 - 131/72)  BP(mean): --  RR: 19 (14 Aug 2017 08:18) (18 - 20)  SpO2: 95% (14 Aug 2017 08:18) (94% - 95%)    General: No acute distress  HEENT: EOM intact,  LHHA  Abdomen: Soft, nontender, nondistended   Extremities: No edema    NEUROLOGICAL EXAM:  Mental status: Eyes open, alert, oriented x3, fluent speech, left motor neglect, able to follow commands, able to ID objects   Cranial Nerves: Left facial droop, LHH, no nystagmus, no dysarthria, left gaze palsy which would be overcome across the midline with stimulation  Motor exam: RUE 5/5, RLE 5/5, left hemiparesis with component of sensory and motor neglect: LUE 5/5 and LLE 3/5 with drift   Sensation:  Left side sensory extension  Coordination/ Gait: No dysmetria    LABS:                        13.2   7.3   )-----------( 203      ( 13 Aug 2017 05:55 )             38.4    08-13    139  |  101  |  19  ----------------------------<  95  4.6   |  25  |  0.71    Ca    9.4      13 Aug 2017 05:55      Hemoglobin A1C, Whole Blood: 5.5 % (08-09 @ 22:36)  Hemoglobin A1C, Whole Blood: 5.4 % (08-09 @ 10:42)      IMAGING: Reviewed by me.     Head CT (08/08) Severely motion degraded CT scan. There is limited visualization of a large acute intraparenchymal hemorrhage centered in the right temporal-occipital  region that measures approximately 6 x 3.5 x 4 cm. Mild surrounding vasogenic edema and mass effect. No evidence of brain herniation or hydrocephalus hydrocephalus. The hemorrhage may be due to hypertensive bleed or hemorrhagic transformation of an infarct.   head CT (08/09) Interval right parietal craniotomy and evacuation of right posterior parietal, occipital, and temporal parenchymal hemorrhage. Scattered residual parenchymal hemorrhage in the subjacent surgical bed. Residual edema surrounding the surgical bed. Resolution of leftward midline shift. No effacement of the basal cisterns or hydrocephalus.  MRA Neck w/Cont (08.12.17)  Normal MRA of the neck.  MRA Head w/Cont (08.12.17)   Poorly visualized distal branches of the left middle   cerebral artery. CT angiography may be helpful for further evaluation.  Azygos anterior cerebral artery and fetal origin of the posterior   cerebral arteries, normal anatomic variants.  No abnormal vessels identified to suggest a vascular malformation.  MRI Head w/Cont (08.12.17)   Focus of hemorrhage within the right posterior cerebral   hemisphere with surrounding edema with stable mass effect when compared   with the prior CT scan and mild leftward midline shift. Enhancement in   this area may be postoperative in nature or secondary to subacute   infarction although the possibility of an underlying lesion is not   entirely excluded and continued follow-up is advised.

## 2017-08-14 NOTE — PROGRESS NOTE ADULT - SUBJECTIVE AND OBJECTIVE BOX
Patient is a 72y old  Male who presents with a chief complaint of Headache (10 Aug 2017 09:20)  Had ICH evacuated    INTERVAL HPI/OVERNIGHT EVENTS:  complained of urinary frequency    MEDICATIONS  (STANDING):  docusate sodium 100 milliGRAM(s) Oral three times a day  polyethylene glycol 3350 17 Gram(s) Oral daily  enoxaparin Injectable 40 milliGRAM(s) SubCutaneous <User Schedule>    MEDICATIONS  (PRN):  acetaminophen    Suspension. 650 milliGRAM(s) Oral every 6 hours PRN Mild Pain (1 - 3)  ondansetron Injectable 4 milliGRAM(s) IV Push every 6 hours PRN Nausea and/or Vomiting  oxyCODONE    5 mG/acetaminophen 325 mG 1 Tablet(s) Oral every 4 hours PRN Severe Pain (7 - 10)  acetaminophen   Tablet 650 milliGRAM(s) Oral every 6 hours PRN For Temp greater than 38 C (100.4 F)        Orders last 24 hours:      Allergies    No Known Allergies    Intolerances        REVIEW OF SYSTEMS:  CARDIOVASCULAR: No chest pain, palpitations, dizziness, or leg swelling; no shortness of breath     RESPIRATORY: No cough, wheezing, chills or hemoptysis; No shortness of breath    GASTROINTESTINAL: No abdominal or epigastric pain. No nausea, vomiting, or hematemesis; No diarrhea or constipation. No melena or hematochezia.    NEUROLOGICAL: No headaches, memory loss, loss of strength, numbness      PHYSICAL EXAM:  Vital Signs Last 24 Hrs  T(C): 37 (14 Aug 2017 04:38), Max: 37.2 (13 Aug 2017 19:55)  T(F): 98.6 (14 Aug 2017 04:38), Max: 99 (13 Aug 2017 19:55)  HR: 66 (14 Aug 2017 04:38) (63 - 76)  BP: 131/72 (14 Aug 2017 04:38) (103/59 - 131/72)  BP(mean): --  RR: 18 (14 Aug 2017 04:38) (18 - 20)  SpO2: 94% (14 Aug 2017 04:38) (94% - 95%)    GENERAL: NAD, well-groomed, well-developed  HEAD:  Atraumatic, Normocephalic  EYES: EOMI, PERRLA, conjunctiva and sclera clear  NECK: Supple, No JVD, Normal thyroid  NERVOUS SYSTEM:  Alert & Oriented X3, Good concentration;  and left upper extremity weakness; left hemineglect  LUNG: Clear to auscultation bilaterally; No rales, rhonchi, wheezing, or rubs  HEART: S1S2 regular, without murmur, rub nor gallop  ABDOMEN: Soft, Nontender, Nondistended; Bowel sounds present  EXTREMITIES:  2+ Peripheral Pulses, No clubbing, cyanosis, or edema      LABS:      Ca    9.4        13 Aug 2017 05:55< from: MRI Head w/wo Cont (08.12.17 @ 21:17) >  IMPRESSION:  Focus of hemorrhage within the right posterior cerebral   hemisphere with surrounding edema with stable mass effect when compared   with the prior CT scan and mild leftward midline shift. Enhancement in   this area may be postoperative in nature or secondary to subacute   infarction although the possibility of an underlying lesion is not   entirely excluded and continued follow-up is advised.    < end of copied text >  < from: MRI Head w/wo Cont (08.12.17 @ 21:17) >  IMPRESSION:  Poorly visualized distal branches of the left middle   cerebral artery. CT angiography may be helpful for further evaluation.    < end of copied text >        assessment:  POD 4.     Plan:   Neuro f/u.  Need for CT angio?   check U/A

## 2017-08-14 NOTE — PROGRESS NOTE ADULT - ASSESSMENT
ASSESSMENT:   72 years old man with vascular risk factors of age and hyperlipidemia is evaluated at [facility] for acute onset of headache and left sided weakness since 8/8/17. CT brain on admission showed right tempo-parieto-occipital ICH with surrounding vasogenic edema leading to mass effect and brain compression. He subsequently underwent craniectomy and hematoma evacuation. MRI brain showed stable right parietal occipital ICH with surrounding vasogenic edema and minimal IVH as well as evidence of few microhemorrhages predominantly located in cortical/subcortical locations and mild-to-moderate leukoaraiosis. MRA head and neck did not show any evidence of vascular malformation or significant intracranial or extracranial large vessel severe stenosis or occlusion to my eyes.Impression:Nontraumatic right hemispheric cortically located ICH. Right tempo-parieto-occipital ICH - likely etiology being possible cerebral amyloid angiopathy and less likely to be secondary to underlying tissue pathology Associated vasogenic edema leading to mass effect and brain compression - status post hematoma evacuation    NEURO: neurologically without acute change, s/p subgaleal drain removal (08/10), s/p Craniotomy for evacuation of hematoma 8/8- he should follow up with neurosurgery upon d/c from rehab, blood pressure goal - normotension, statin not indicated from stroke standpoint. plan to obtain repeat brain neuroimaging in 4-6  weeks to eval underlying tissue pathology once ICH would be resorbed. Plan/goals of care discussed with pt and pt's son at bedside. Physical therapy/OT eval with [facility], continue current rehab treatment.     ANTITHROMBOTIC THERAPY: None due to ICH and no specific indications at this time    PULMONARY: S/p extubation 8/9, protecting airway, saturating well     CARDIOVASCULAR: TTE unremarkable EF 65%, cardiac monitoring no events                            GASTROINTESTINAL:  Dysphagia screen passed, tolerating diet, bowel regimen for constipation      Diet: Regular    RENAL: BUN/Cr without acute change, maintain adequate perfusion, good urine output      Na Goal: Greater than 135     Brice: N    HEMATOLOGY: H/H without acute change, no active bleeding,  Platelets 203     DVT ppx: LMWH     ID: Afebrile, no leukocytosis     OTHER: Plan of care discussed with patient  family member (son) at bedside. All the questions were answered and concerns were addressed.    DISPOSITION: D/C to [facility] as per PMR eval asstable and workup is complete    CORE MEASURES:        Admission NIHSS: 12     TPA:  NO      LDL/HDL: 33/44     Depression Screen: 0     Statin Therapy: y     Dysphagia Screen: PASS     Smoking  NO      Afib NO     [employer] Education YES ASSESSMENT:   72 years old man with vascular risk factors of age and hyperlipidemia is evaluated at [facility] for acute onset of headache and left sided weakness since 8/8/17. CT brain on admission showed right tempo-parieto-occipital ICH with surrounding vasogenic edema leading to mass effect and brain compression. He subsequently underwent craniectomy and hematoma evacuation. MRI brain showed stable right parietal occipital ICH with surrounding vasogenic edema and minimal IVH as well as evidence of few microhemorrhages predominantly located in cortical/subcortical locations and mild-to-moderate leukoaraiosis. MRA head and neck did not show any evidence of vascular malformation or significant intracranial or extracranial large vessel severe stenosis or occlusion to my eyes.Impression:Nontraumatic right hemispheric cortically located ICH. Right tempo-parieto-occipital ICH - likely etiology being possible cerebral amyloid angiopathy and less likely to be secondary to underlying tissue pathology. Associated vasogenic edema leading to mass effect and brain compression - status post hematoma evacuation    NEURO: neurologically without acute change, s/p subgaleal drain removal (08/10), s/p Craniotomy for evacuation of hematoma 8/8- he should follow up with neurosurgery upon d/c from rehab, blood pressure goal - normotension, statin not indicated from stroke standpoint. plan to obtain repeat brain neuroimaging in 4-6  weeks to eval underlying tissue pathology once ICH would be resorbed. Plan/goals of care discussed with pt and pt's son at bedside. Physical therapy/OT eval with AR, continue current rehab treatment.     ANTITHROMBOTIC THERAPY: None due to ICH and no specific indications at this time    PULMONARY: S/p extubation 8/9, protecting airway, saturating well     CARDIOVASCULAR: TTE unremarkable EF 65%, cardiac monitoring no events                            GASTROINTESTINAL:  Dysphagia screen passed, tolerating diet, bowel regimen for constipation      Diet: Regular    RENAL: BUN/Cr without acute change, maintain adequate perfusion, good urine output      Na Goal: Greater than 135     Brice: N    HEMATOLOGY: H/H without acute change, no active bleeding,  Platelets 203     DVT ppx: LMWH     ID: Afebrile, no leukocytosis     OTHER: Plan of care discussed with patient  family member (son) at bedside. All the questions were answered and concerns were addressed.    DISPOSITION: D/C to [facility] today.    CORE MEASURES:        Admission NIHSS: 12     TPA:  NO      LDL/HDL: 33/44     Depression Screen: 0     Statin Therapy: y     Dysphagia Screen: PASS     Smoking  NO      Afib NO     Stoke Education YES

## 2017-08-14 NOTE — PROGRESS NOTE ADULT - SUBJECTIVE AND OBJECTIVE BOX
Patient tolerating PT/OT/SLP  Denies pain.  Eager to go to rehab.    Vital Signs Last 24 Hrs  T(C): 37 (14 Aug 2017 12:45), Max: 37.2 (13 Aug 2017 19:55)  T(F): 98.6 (14 Aug 2017 12:45), Max: 99 (13 Aug 2017 19:55)  HR: 65 (14 Aug 2017 12:45) (65 - 76)  BP: 123/79 (14 Aug 2017 12:45) (103/59 - 131/72)  BP(mean): --  RR: 19 (14 Aug 2017 12:45) (18 - 20)  SpO2: 95% (14 Aug 2017 12:45) (94% - 95%)    PHYSICAL EXAM  Constitutional - NAD, Comfortable  HEENT - +Right scalp posterior incisions with staples C/D/I, EOMI  Neck - Supple  Chest - CTA bilaterally  Cardiovascular - RRR, S1S2  Abdomen - BS+, Soft, NTND  Extremities - No C/C/E, No calf tenderness   Neurologic Exam -                    Cognitive - Awake, Alert, AAO to self, place, date, year, not entire situation, +Left sided neglect     Communication - Fluent, No dysarthria, naming and repetition intact.        Motor - No focal deficits                    LEFT    UE - ShAB 5/5, EF 5/5, EE 5/5, WE 5/5,  5/5                    RIGHT UE - ShAB 5/5, EF 5/5, EE 5/5, WE 5/5,  5/5                    LEFT    LE - HF 5/5, KE 5/5, DF 5/5, PF 5/5                    RIGHT LE - HF 5/5, KE 5/5, DF 5/5, PF 5/5        Sensory - Intact to LT, Impaired extinction, No clonus    Psychiatric - Affect WNL    RECENT LABS/IMAGING                        13.2   7.3   )-----------( 203      ( 13 Aug 2017 05:55 )             38.4   08-13    139  |  101  |  19  ----------------------------<  95  4.6   |  25  |  0.71    Ca    9.4      13 Aug 2017 05:55        ASSESSMENT/PLAN  71 y/o M with throbbing headache found to have right temero-occipital intraparenchymal hemorrhage believed to be ischemic CVA with hemorrhagic transformation, with functional, gait, ADL, cognitive, visual impairments.     Dispo- - Recommend ACUTE inpatient rehabilitation for the functional deficits consisting of 3 hours of therapy/day & 24 hour RN/daily PM&R physician for comorbid medical management.   PT- ROM, Bed Mob, Transfers, Amb with AD  OT- ADLs, ROM  SLP- Cognitive evaluation and treatment  Prec- Falls, Cardiac  DVT Prophylaxis - Lovenox 40mg SC Q24hrs  Skin- Turn Q2hrs  Pain - Tylenol PRN, Percocet PRN  Weight bearing status - Full weight bearing  Diet - Regular/thins

## 2017-08-16 ENCOUNTER — TRANSCRIPTION ENCOUNTER (OUTPATIENT)
Age: 72
End: 2017-08-16

## 2017-09-08 ENCOUNTER — APPOINTMENT (OUTPATIENT)
Dept: NEUROSURGERY | Facility: CLINIC | Age: 72
End: 2017-09-08
Payer: MEDICARE

## 2017-09-08 VITALS
SYSTOLIC BLOOD PRESSURE: 145 MMHG | BODY MASS INDEX: 28.73 KG/M2 | HEART RATE: 65 BPM | HEIGHT: 69 IN | DIASTOLIC BLOOD PRESSURE: 83 MMHG | WEIGHT: 194 LBS

## 2017-09-08 PROCEDURE — 99024 POSTOP FOLLOW-UP VISIT: CPT

## 2017-10-04 ENCOUNTER — APPOINTMENT (OUTPATIENT)
Dept: OPHTHALMOLOGY | Facility: CLINIC | Age: 72
End: 2017-10-04

## 2017-10-13 ENCOUNTER — APPOINTMENT (OUTPATIENT)
Dept: NEUROLOGY | Facility: CLINIC | Age: 72
End: 2017-10-13

## 2017-10-19 PROCEDURE — P9037: CPT

## 2017-10-19 PROCEDURE — A9585: CPT

## 2017-10-19 PROCEDURE — 97116 GAIT TRAINING THERAPY: CPT

## 2017-10-19 PROCEDURE — 88342 IMHCHEM/IMCYTCHM 1ST ANTB: CPT

## 2017-10-19 PROCEDURE — 85027 COMPLETE CBC AUTOMATED: CPT

## 2017-10-19 PROCEDURE — 82550 ASSAY OF CK (CPK): CPT

## 2017-10-19 PROCEDURE — 80053 COMPREHEN METABOLIC PANEL: CPT

## 2017-10-19 PROCEDURE — 86901 BLOOD TYPING SEROLOGIC RH(D): CPT

## 2017-10-19 PROCEDURE — 70544 MR ANGIOGRAPHY HEAD W/O DYE: CPT

## 2017-10-19 PROCEDURE — 88307 TISSUE EXAM BY PATHOLOGIST: CPT

## 2017-10-19 PROCEDURE — 82947 ASSAY GLUCOSE BLOOD QUANT: CPT

## 2017-10-19 PROCEDURE — 97110 THERAPEUTIC EXERCISES: CPT

## 2017-10-19 PROCEDURE — 36430 TRANSFUSION BLD/BLD COMPNT: CPT

## 2017-10-19 PROCEDURE — C1713: CPT

## 2017-10-19 PROCEDURE — 80061 LIPID PANEL: CPT

## 2017-10-19 PROCEDURE — 99285 EMERGENCY DEPT VISIT HI MDM: CPT | Mod: 25

## 2017-10-19 PROCEDURE — 70548 MR ANGIOGRAPHY NECK W/DYE: CPT

## 2017-10-19 PROCEDURE — 85014 HEMATOCRIT: CPT

## 2017-10-19 PROCEDURE — 93306 TTE W/DOPPLER COMPLETE: CPT

## 2017-10-19 PROCEDURE — 83036 HEMOGLOBIN GLYCOSYLATED A1C: CPT

## 2017-10-19 PROCEDURE — C1769: CPT

## 2017-10-19 PROCEDURE — 84295 ASSAY OF SERUM SODIUM: CPT

## 2017-10-19 PROCEDURE — 97535 SELF CARE MNGMENT TRAINING: CPT

## 2017-10-19 PROCEDURE — 97162 PT EVAL MOD COMPLEX 30 MIN: CPT

## 2017-10-19 PROCEDURE — 84100 ASSAY OF PHOSPHORUS: CPT

## 2017-10-19 PROCEDURE — 86850 RBC ANTIBODY SCREEN: CPT

## 2017-10-19 PROCEDURE — 83735 ASSAY OF MAGNESIUM: CPT

## 2017-10-19 PROCEDURE — 93005 ELECTROCARDIOGRAM TRACING: CPT

## 2017-10-19 PROCEDURE — 85730 THROMBOPLASTIN TIME PARTIAL: CPT

## 2017-10-19 PROCEDURE — 96376 TX/PRO/DX INJ SAME DRUG ADON: CPT

## 2017-10-19 PROCEDURE — 94002 VENT MGMT INPAT INIT DAY: CPT

## 2017-10-19 PROCEDURE — 80048 BASIC METABOLIC PNL TOTAL CA: CPT

## 2017-10-19 PROCEDURE — 83605 ASSAY OF LACTIC ACID: CPT

## 2017-10-19 PROCEDURE — 96375 TX/PRO/DX INJ NEW DRUG ADDON: CPT

## 2017-10-19 PROCEDURE — 86900 BLOOD TYPING SEROLOGIC ABO: CPT

## 2017-10-19 PROCEDURE — 82330 ASSAY OF CALCIUM: CPT

## 2017-10-19 PROCEDURE — 97166 OT EVAL MOD COMPLEX 45 MIN: CPT

## 2017-10-19 PROCEDURE — C1889: CPT

## 2017-10-19 PROCEDURE — 96374 THER/PROPH/DIAG INJ IV PUSH: CPT

## 2017-10-19 PROCEDURE — 84484 ASSAY OF TROPONIN QUANT: CPT

## 2017-10-19 PROCEDURE — 70553 MRI BRAIN STEM W/O & W/DYE: CPT

## 2017-10-19 PROCEDURE — 70450 CT HEAD/BRAIN W/O DYE: CPT

## 2017-10-19 PROCEDURE — 81003 URINALYSIS AUTO W/O SCOPE: CPT

## 2017-10-19 PROCEDURE — 85610 PROTHROMBIN TIME: CPT

## 2017-10-19 PROCEDURE — 82435 ASSAY OF BLOOD CHLORIDE: CPT

## 2017-10-19 PROCEDURE — 82803 BLOOD GASES ANY COMBINATION: CPT

## 2017-10-19 PROCEDURE — 84132 ASSAY OF SERUM POTASSIUM: CPT

## 2017-11-16 ENCOUNTER — APPOINTMENT (OUTPATIENT)
Dept: NEUROLOGY | Facility: CLINIC | Age: 72
End: 2017-11-16

## 2019-03-25 ENCOUNTER — APPOINTMENT (OUTPATIENT)
Dept: DERMATOLOGY | Facility: CLINIC | Age: 74
End: 2019-03-25
Payer: MEDICARE

## 2019-03-25 VITALS — SYSTOLIC BLOOD PRESSURE: 118 MMHG | DIASTOLIC BLOOD PRESSURE: 70 MMHG

## 2019-03-25 DIAGNOSIS — L82.1 OTHER SEBORRHEIC KERATOSIS: ICD-10-CM

## 2019-03-25 DIAGNOSIS — L02.92 FURUNCLE, UNSPECIFIED: ICD-10-CM

## 2019-03-25 DIAGNOSIS — Z80.8 FAMILY HISTORY OF MALIGNANT NEOPLASM OF OTHER ORGANS OR SYSTEMS: ICD-10-CM

## 2019-03-25 DIAGNOSIS — Z12.83 ENCOUNTER FOR SCREENING FOR MALIGNANT NEOPLASM OF SKIN: ICD-10-CM

## 2019-03-25 DIAGNOSIS — B07.9 VIRAL WART, UNSPECIFIED: ICD-10-CM

## 2019-03-25 DIAGNOSIS — Z91.89 OTHER SPECIFIED PERSONAL RISK FACTORS, NOT ELSEWHERE CLASSIFIED: ICD-10-CM

## 2019-03-25 PROCEDURE — 99203 OFFICE O/P NEW LOW 30 MIN: CPT | Mod: 25

## 2019-03-25 PROCEDURE — 17110 DESTRUCTION B9 LES UP TO 14: CPT

## 2019-03-25 RX ORDER — MUPIROCIN 20 MG/G
2 OINTMENT TOPICAL
Qty: 1 | Refills: 3 | Status: ACTIVE | COMMUNITY
Start: 2019-03-25 | End: 1900-01-01

## 2019-11-23 ENCOUNTER — INPATIENT (INPATIENT)
Facility: HOSPITAL | Age: 74
LOS: 1 days | Discharge: HOME | DRG: 64 | End: 2019-11-25
Attending: SPECIALIST | Admitting: SPECIALIST
Payer: MEDICARE

## 2019-11-23 VITALS
OXYGEN SATURATION: 98 % | DIASTOLIC BLOOD PRESSURE: 82 MMHG | WEIGHT: 195.11 LBS | RESPIRATION RATE: 16 BRPM | HEIGHT: 69 IN | HEART RATE: 53 BPM | TEMPERATURE: 98 F | SYSTOLIC BLOOD PRESSURE: 182 MMHG

## 2019-11-23 DIAGNOSIS — I61.9 NONTRAUMATIC INTRACEREBRAL HEMORRHAGE, UNSPECIFIED: ICD-10-CM

## 2019-11-23 LAB
ALBUMIN SERPL ELPH-MCNC: 4.8 G/DL — SIGNIFICANT CHANGE UP (ref 3.3–5)
ALP SERPL-CCNC: 60 U/L — SIGNIFICANT CHANGE UP (ref 40–120)
ALT FLD-CCNC: 16 U/L — SIGNIFICANT CHANGE UP (ref 10–45)
ANION GAP SERPL CALC-SCNC: 10 MMOL/L — SIGNIFICANT CHANGE UP (ref 5–17)
APTT BLD: 30.5 SEC — SIGNIFICANT CHANGE UP (ref 27.5–36.3)
AST SERPL-CCNC: 15 U/L — SIGNIFICANT CHANGE UP (ref 10–40)
BASOPHILS # BLD AUTO: 0.03 K/UL — SIGNIFICANT CHANGE UP (ref 0–0.2)
BASOPHILS NFR BLD AUTO: 0.5 % — SIGNIFICANT CHANGE UP (ref 0–2)
BILIRUB SERPL-MCNC: 0.5 MG/DL — SIGNIFICANT CHANGE UP (ref 0.2–1.2)
BLD GP AB SCN SERPL QL: NEGATIVE — SIGNIFICANT CHANGE UP
BUN SERPL-MCNC: 17 MG/DL — SIGNIFICANT CHANGE UP (ref 7–23)
CALCIUM SERPL-MCNC: 10.3 MG/DL — SIGNIFICANT CHANGE UP (ref 8.4–10.5)
CHLORIDE SERPL-SCNC: 101 MMOL/L — SIGNIFICANT CHANGE UP (ref 96–108)
CO2 SERPL-SCNC: 29 MMOL/L — SIGNIFICANT CHANGE UP (ref 22–31)
CREAT SERPL-MCNC: 0.69 MG/DL — SIGNIFICANT CHANGE UP (ref 0.5–1.3)
EOSINOPHIL # BLD AUTO: 0.07 K/UL — SIGNIFICANT CHANGE UP (ref 0–0.5)
EOSINOPHIL NFR BLD AUTO: 1.1 % — SIGNIFICANT CHANGE UP (ref 0–6)
GLUCOSE SERPL-MCNC: 95 MG/DL — SIGNIFICANT CHANGE UP (ref 70–99)
HCT VFR BLD CALC: 44.8 % — SIGNIFICANT CHANGE UP (ref 39–50)
HGB BLD-MCNC: 15.3 G/DL — SIGNIFICANT CHANGE UP (ref 13–17)
IMM GRANULOCYTES NFR BLD AUTO: 0.2 % — SIGNIFICANT CHANGE UP (ref 0–1.5)
INR BLD: 1.03 RATIO — SIGNIFICANT CHANGE UP (ref 0.88–1.16)
LYMPHOCYTES # BLD AUTO: 2.65 K/UL — SIGNIFICANT CHANGE UP (ref 1–3.3)
LYMPHOCYTES # BLD AUTO: 40.4 % — SIGNIFICANT CHANGE UP (ref 13–44)
MCHC RBC-ENTMCNC: 32.6 PG — SIGNIFICANT CHANGE UP (ref 27–34)
MCHC RBC-ENTMCNC: 34.2 GM/DL — SIGNIFICANT CHANGE UP (ref 32–36)
MCV RBC AUTO: 95.5 FL — SIGNIFICANT CHANGE UP (ref 80–100)
MONOCYTES # BLD AUTO: 0.67 K/UL — SIGNIFICANT CHANGE UP (ref 0–0.9)
MONOCYTES NFR BLD AUTO: 10.2 % — SIGNIFICANT CHANGE UP (ref 2–14)
NEUTROPHILS # BLD AUTO: 3.13 K/UL — SIGNIFICANT CHANGE UP (ref 1.8–7.4)
NEUTROPHILS NFR BLD AUTO: 47.6 % — SIGNIFICANT CHANGE UP (ref 43–77)
NRBC # BLD: 0 /100 WBCS — SIGNIFICANT CHANGE UP (ref 0–0)
PLATELET # BLD AUTO: 195 K/UL — SIGNIFICANT CHANGE UP (ref 150–400)
POTASSIUM SERPL-MCNC: 4.5 MMOL/L — SIGNIFICANT CHANGE UP (ref 3.5–5.3)
POTASSIUM SERPL-SCNC: 4.5 MMOL/L — SIGNIFICANT CHANGE UP (ref 3.5–5.3)
PROT SERPL-MCNC: 7.7 G/DL — SIGNIFICANT CHANGE UP (ref 6–8.3)
PROTHROM AB SERPL-ACNC: 11.7 SEC — SIGNIFICANT CHANGE UP (ref 10–12.9)
RBC # BLD: 4.69 M/UL — SIGNIFICANT CHANGE UP (ref 4.2–5.8)
RBC # FLD: 12.5 % — SIGNIFICANT CHANGE UP (ref 10.3–14.5)
RH IG SCN BLD-IMP: POSITIVE — SIGNIFICANT CHANGE UP
SODIUM SERPL-SCNC: 140 MMOL/L — SIGNIFICANT CHANGE UP (ref 135–145)
WBC # BLD: 6.56 K/UL — SIGNIFICANT CHANGE UP (ref 3.8–10.5)
WBC # FLD AUTO: 6.56 K/UL — SIGNIFICANT CHANGE UP (ref 3.8–10.5)

## 2019-11-23 PROCEDURE — 99292 CRITICAL CARE ADDL 30 MIN: CPT

## 2019-11-23 PROCEDURE — 99291 CRITICAL CARE FIRST HOUR: CPT

## 2019-11-23 PROCEDURE — 93010 ELECTROCARDIOGRAM REPORT: CPT

## 2019-11-23 PROCEDURE — 70450 CT HEAD/BRAIN W/O DYE: CPT | Mod: 26,59

## 2019-11-23 PROCEDURE — 70496 CT ANGIOGRAPHY HEAD: CPT | Mod: 26

## 2019-11-23 PROCEDURE — 70498 CT ANGIOGRAPHY NECK: CPT | Mod: 26

## 2019-11-23 PROCEDURE — 71045 X-RAY EXAM CHEST 1 VIEW: CPT | Mod: 26

## 2019-11-23 RX ORDER — HYDRALAZINE HCL 50 MG
10 TABLET ORAL EVERY 6 HOURS
Refills: 0 | Status: DISCONTINUED | OUTPATIENT
Start: 2019-11-23 | End: 2019-11-25

## 2019-11-23 RX ORDER — DONEPEZIL HYDROCHLORIDE 10 MG/1
5 TABLET, FILM COATED ORAL AT BEDTIME
Refills: 0 | Status: DISCONTINUED | OUTPATIENT
Start: 2019-11-23 | End: 2019-11-25

## 2019-11-23 RX ORDER — DONEPEZIL HYDROCHLORIDE 10 MG/1
1 TABLET, FILM COATED ORAL
Qty: 0 | Refills: 0 | DISCHARGE

## 2019-11-23 RX ORDER — ATORVASTATIN CALCIUM 80 MG/1
40 TABLET, FILM COATED ORAL AT BEDTIME
Refills: 0 | Status: DISCONTINUED | OUTPATIENT
Start: 2019-11-23 | End: 2019-11-25

## 2019-11-23 RX ORDER — ATORVASTATIN CALCIUM 80 MG/1
1 TABLET, FILM COATED ORAL
Qty: 0 | Refills: 0 | DISCHARGE

## 2019-11-23 RX ADMIN — DONEPEZIL HYDROCHLORIDE 5 MILLIGRAM(S): 10 TABLET, FILM COATED ORAL at 22:33

## 2019-11-23 NOTE — CONSULT NOTE ADULT - SUBJECTIVE AND OBJECTIVE BOX
HPI:  74M w/ PMH of HLD, IPH (R. parieto-temporal s/p craniotomy in 2017) presented w/ transient episodes of speech disturbance.     Patient woke up this morning complaining of headache. Shortly afterwards he had multiple episodes of speech disturbance lasting from 5 minutes to 1 hour. No LOC. No jerking movements. No urinary incontinence. Denies changes in vision, hearing, swallowing, voice quality, numbness/tingling, weakness, balance/room-spinning sensations. No previous history of  seizures, migraines. No history of arrythmia. No AC. Was taken off ASA after last IPH.     In the ED:   CTH demonstrates 1.8 x 1.1 x 1.4 cm TPO parenchymal hemorrhage.     (Stroke only)  NIHSS: 0  MRS: 0  ICH: 1    Meds:   -Donepizel 5mg  -Atorvastatin 40mg     A 10-system ROS was performed and is negative except for those items noted above and/or in the HPI.    PAST MEDICAL & SURGICAL HISTORY:  Herniated disc, cervical  BPH (benign prostatic hypertrophy)  GERD (gastroesophageal reflux disease)  Hyperlipidemia  S/P angioplasty:  - [facility]  IH (inguinal hernia): Left Inguinal hernia repair 14  After cataract: Right Eye IOL    FAMILY HISTORY:  No pertinent family history in first degree relatives    SOCIAL HISTORY:   T/E/D: No smoke, drink, drugs     Exam:   MS: A&Ox3. Language fluent, comprensive, w/ intact repetition. Attentive.   CN: VFF. LHH.  EOMI. V1-V3 intact. Mild L. facial droop.  T/u midline. Normal shrug.   Motor: Full strength throughout  Sensation: intact to PP throughout  Coordination: No dysmetria on FNF or H2S bilaterally   Gait: Deferred     STUDIES & IMAGIN-23 Na 140   K 4.5   P --   Mg --   Ca 10.3   Cr 0.69

## 2019-11-23 NOTE — H&P ADULT - ASSESSMENT
Patient is aware of her results and that she needs additional imaging on her left breast  She has the appt scheduled  74M w/ PMH of HLD, IPH (R. parieto-temporal s/p craniotomy in 2017) presented w/ transient episodes of speech disturbance now back to baseline. Exam w/ LHH and L. nasolabial flattening (chronic). CTH demonstrates 1.8 x 1.1 x 1.4 cm TPO parenchymal hemorrhage.     Impression: L. 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 IPH. Less likely HTN given lack of history.     Plan:     Admit to neurology under Dr. Conrado Recio     Imaging:   []Serial CTH 12h at 0700 11/24  []MRI Brain w/o contrast 24 hours after 11/24 1900    Medications:  [] Hold ASA  [] Statin 40mg   [] Hold Lovenox for 24 hours; Compressive     Labs:   [] A1c, Lipid panel      Other:   [] BP goal <160/90   [] PT/OT  [] Cardiac tele 74M w/ PMH of HLD, IPH (R. parieto-temporal s/p craniotomy in 2017) presented w/ transient episodes of speech disturbance now back to baseline. Exam w/ LHH and L. nasolabial flattening (chronic). CTH demonstrates 1.8 x 1.1 x 1.4 cm TPO parenchymal hemorrhage.     Impression: L. 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 IPH. Less likely HTN given lack of history.     Plan:     Admit to neurology under Dr. Conrado Recio     Imaging:   []Serial CTH 12h at 0700 11/24  [x] CTA h/n negeative   []MRI Brain w/o contrast 24 hours after 11/24 1900    Medications:  [] Hold ASA  [] Statin 40mg   [] Hold Lovenox for 24 hours; Compressive     Labs:   [] A1c, Lipid panel      Other:   [] BP goal <160/90   [] PT/OT  [] Cardiac tele

## 2019-11-23 NOTE — ED ADULT NURSE REASSESSMENT NOTE - NS ED NURSE REASSESS COMMENT FT1
Patient resting comfortably in stretcher, a/ox3, VSS, ambulatory, moving all extremities, following commands, speaking coherently with minimal speech disturbances. Improving speech noted. Patient's current /80. 10mg of Hydralazine not given at this time and held for dosing parameters. Patient denies CP/SOB, f/c, n/v/d, abdominal pain, numbness/tingling/weakness, dizziness/lightheadedness at this time. Patient pending bed assignment. Plan of care discussed and initiated with patient. Patient verbalizes understanding.

## 2019-11-23 NOTE — H&P ADULT - HISTORY OF PRESENT ILLNESS
74M w/ PMH of HLD, IPH (R. parieto-temporal s/p craniotomy in 2017) presented w/ transient episodes of speech disturbance.     Patient woke up this morning complaining of headache. Shortly afterwards he had multiple episodes of speech disturbance lasting from 5 minutes to 1 hour. No LOC. No jerking movements. No urinary incontinence. Denies changes in vision, hearing, swallowing, voice quality, numbness/tingling, weakness, balance/room-spinning sensations. No previous history of  seizures, migraines. No history of arrythmia. No AC. Was taken off ASA after last IPH.     In the ED:   CTH demonstrates 1.8 x 1.1 x 1.4 cm TPO parenchymal hemorrhage.     (Stroke only)  NIHSS: 0  MRS: 0  ICH: 1    Meds:   -Donepizel 5mg  -Atorvastatin 40mg

## 2019-11-23 NOTE — ED PROVIDER NOTE - NEUROLOGICAL, MLM
Speaking clearly but seems frustrated unable to identify common objects and unable to speak in a fluent sentence

## 2019-11-23 NOTE — ED ADULT NURSE NOTE - ED STAT RN HANDOFF DETAILS
Bedside handoff given to holding RNGayle. RN aware of reason for stay, PMH, and has no further questions.

## 2019-11-23 NOTE — ED ADULT TRIAGE NOTE - HEIGHT IN CM
Detail Level: Simple
Initiate Treatment: In [place], pt got sick on PUVA (psoralen pill). Clob did not help her huge BSA.
175.26

## 2019-11-23 NOTE — H&P ADULT - ATTENDING COMMENTS
VASCULAR NEUROLOGY ATTENDING  The patient is seen and examined the history and imaging are reviewed. I agree with the resident note unless otherwise noted. In brief He was initially  evaluated at [facility] for acute onset of headache and left sided weakness since 8/8/17. CT brain on admission showed right tempo-parieto-occipital ICH with surrounding vasogenic edema leading to mass effect and brain compression. He subsequently underwent craniectomy and hematoma evacuation. MRI brain showed stable right parietal occipital ICH with surrounding vasogenic edema and minimal IVH, Pathology confirmed CAA.     On 11/23/19 He developed transient episodes of speech disturbance now back to baseline. On exam LHH and L. nasolabial flattening (chronic). CTH demonstrates 1.8 x 1.1 x 1.4 cm L parenchymal hemorrhage.     Impression: Biopsy proven CAA with recurrent ICH. Will continue supportive care. Avoid AP/AC and NSAIDs. Reapeat CT. SBP below 160. Avoid hyponatremia.      Impression:Nontraumatic right hemispheric cortically located ICH. Right tempo-parieto-occipital ICH - likely etiology being possible cerebral amyloid angiopathy and less likely to be secondary to underlying tissue pathology. Associated vasogenic edema leading to mass effect and brain compression - status post hematoma evacuation

## 2019-11-23 NOTE — ED PROVIDER NOTE - PSH
After cataract  Right Eye IOL  IH (inguinal hernia)  Left Inguinal hernia repair 7/5/14  S/P angioplasty  2014 - [facility]

## 2019-11-23 NOTE — H&P ADULT - NSHPPHYSICALEXAM_GEN_ALL_CORE
Exam:   MS: A&Ox3. Language fluent, comprensive, w/ intact repetition. Attentive.   CN: VFF. LHH.  EOMI. V1-V3 intact. Mild L. facial droop.  T/u midline. Normal shrug.   Motor: Full strength throughout  Sensation: intact to PP throughout  Coordination: No dysmetria on FNF or H2S bilaterally   Gait: Deferred

## 2019-11-23 NOTE — ED PROVIDER NOTE - OBJECTIVE STATEMENT
74 y.o. male history of spontaneous brain bleed 2 years ago brought in by his son for an acute onset of difficulty speaking that started 2 hours ago.  Woke up at his baseline according to the son, did complain of a headache but otherwise was normal .  Over the course of the day contiuned to have the headache.  About 2 hours ago was unable to communicate normally and seemed confused according to the son.  No focal deficit, no numbness, no weakness.  Nothing makes his complaints better or worse.

## 2019-11-23 NOTE — ED ADULT NURSE NOTE - OBJECTIVE STATEMENT
74 year old male comes to the ED with son complaining of dizziness/weakness sensation and speech disturbance. Pt has a PMH of HTN, ICH 2 years ago and GERD. Per pt's son, patient began having difficulty articulating sentences and words around 5pm. Patient seen in ED a/ox3, VSS, ambulatory, able to move all extremities, follows commands and speaking in full coherent sentences. Speech disturbance can be note, but patient able to articulate words after a few second delay. Code stroke called 1904. RN, MD and stroke team seen at bedside prior to pt taken to CT scan. Patient denies CP/SOB, f/c, n/v/d, abdominal pain, hematuria/dysuria/frequency, numbness/tingling/weakness at this time.

## 2019-11-23 NOTE — ED PROVIDER NOTE - CARE PLAN
Principal Discharge DX:	Dysphagia  Secondary Diagnosis:	Delirium Principal Discharge DX:	Hemorrhagic stroke  Secondary Diagnosis:	Delirium

## 2019-11-23 NOTE — ED PROVIDER NOTE - ATTENDING CONTRIBUTION TO CARE
------------ATTENDING NOTE------------   pt w/ son c/o intermittent episodes of confusion and difficulty speaking (trouble "getting words out") over course of day but then resolving completely but lasting for past 2.5 hrs coming to ED, with associated confusion, pt also c/o vague headache "behind eyes" all day, complicated as past ICH/nsgy repair, Code Stroke by triage, no chest pain/discomfort or sob/dyspnea, has equal distal pulses, no abdominal pain/discomfort or nausea/vomiting, awaiting labs/imaging and neuro recs -->  - Marty Perez MD   ------------------------------------------------

## 2019-11-23 NOTE — ED PROVIDER NOTE - CRITICAL CARE PROVIDED
consultation with other physicians/direct patient care (not related to procedure)/consult w/ pt's family directly relating to pts condition/additional history taking/interpretation of diagnostic studies/documentation

## 2019-11-23 NOTE — ED ADULT NURSE NOTE - NSIMPLEMENTINTERV_GEN_ALL_ED
Implemented All Universal Safety Interventions:  [place] to call system. Call bell, personal items and telephone within reach. Instruct patient to call for assistance. Room bathroom lighting operational. Non-slip footwear when patient is off stretcher. Physically safe environment: no spills, clutter or unnecessary equipment. Stretcher in lowest position, wheels locked, appropriate side rails in place.

## 2019-11-23 NOTE — ED ADULT NURSE NOTE - CHPI ED NUR SYMPTOMS NEG
no vomiting/no nausea/no numbness/no loss of consciousness/no confusion/no fever/no change in level of consciousness/no blurred vision

## 2019-11-23 NOTE — CONSULT NOTE ADULT - ASSESSMENT
74M w/ PMH of HLD, IPH (R. parieto-temporal s/p craniotomy in 2017) presented w/ transient episodes of speech disturbance now back to baseline. Exam w/ LHH and L. nasolabial flattening (chronic). CTH demonstrates 1.8 x 1.1 x 1.4 cm TPO parenchymal hemorrhage.     Impression: L. 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 IPH. Less likely HTN given lack of history.     Plan:     Admit to neurology under Dr. Conrado Recio     Imaging:   []Serial CTH 12h at 0700 11/24  []MRI Brain w/o contrast 24 hours after 11/24 1900    Medications:  [] Hold ASA  [] Statin 40mg   [] Hold Lovenox for 24 hours; Compressive     Labs:   [] A1c, Lipid panel      Other:   [] BP goal <160/90   [] PT/OT  [] Cardiac tele

## 2019-11-24 LAB
ALBUMIN SERPL ELPH-MCNC: 4.1 G/DL — SIGNIFICANT CHANGE UP (ref 3.3–5)
ALP SERPL-CCNC: 52 U/L — SIGNIFICANT CHANGE UP (ref 40–120)
ALT FLD-CCNC: 12 U/L — SIGNIFICANT CHANGE UP (ref 10–45)
ANION GAP SERPL CALC-SCNC: 13 MMOL/L — SIGNIFICANT CHANGE UP (ref 5–17)
APTT BLD: 29.6 SEC — SIGNIFICANT CHANGE UP (ref 27.5–36.3)
AST SERPL-CCNC: 15 U/L — SIGNIFICANT CHANGE UP (ref 10–40)
BASOPHILS # BLD AUTO: 0.04 K/UL — SIGNIFICANT CHANGE UP (ref 0–0.2)
BASOPHILS NFR BLD AUTO: 0.7 % — SIGNIFICANT CHANGE UP (ref 0–2)
BILIRUB SERPL-MCNC: 0.4 MG/DL — SIGNIFICANT CHANGE UP (ref 0.2–1.2)
BUN SERPL-MCNC: 16 MG/DL — SIGNIFICANT CHANGE UP (ref 7–23)
CALCIUM SERPL-MCNC: 9.4 MG/DL — SIGNIFICANT CHANGE UP (ref 8.4–10.5)
CHLORIDE SERPL-SCNC: 104 MMOL/L — SIGNIFICANT CHANGE UP (ref 96–108)
CO2 SERPL-SCNC: 23 MMOL/L — SIGNIFICANT CHANGE UP (ref 22–31)
CREAT SERPL-MCNC: 0.69 MG/DL — SIGNIFICANT CHANGE UP (ref 0.5–1.3)
EOSINOPHIL # BLD AUTO: 0.06 K/UL — SIGNIFICANT CHANGE UP (ref 0–0.5)
EOSINOPHIL NFR BLD AUTO: 1 % — SIGNIFICANT CHANGE UP (ref 0–6)
GLUCOSE SERPL-MCNC: 89 MG/DL — SIGNIFICANT CHANGE UP (ref 70–99)
HBA1C BLD-MCNC: 5.4 % — SIGNIFICANT CHANGE UP (ref 4–5.6)
HCT VFR BLD CALC: 44.5 % — SIGNIFICANT CHANGE UP (ref 39–50)
HGB BLD-MCNC: 14.6 G/DL — SIGNIFICANT CHANGE UP (ref 13–17)
IMM GRANULOCYTES NFR BLD AUTO: 0.5 % — SIGNIFICANT CHANGE UP (ref 0–1.5)
INR BLD: 1.04 RATIO — SIGNIFICANT CHANGE UP (ref 0.88–1.16)
LYMPHOCYTES # BLD AUTO: 1.88 K/UL — SIGNIFICANT CHANGE UP (ref 1–3.3)
LYMPHOCYTES # BLD AUTO: 31.1 % — SIGNIFICANT CHANGE UP (ref 13–44)
MAGNESIUM SERPL-MCNC: 2.2 MG/DL — SIGNIFICANT CHANGE UP (ref 1.6–2.6)
MCHC RBC-ENTMCNC: 32.2 PG — SIGNIFICANT CHANGE UP (ref 27–34)
MCHC RBC-ENTMCNC: 32.8 GM/DL — SIGNIFICANT CHANGE UP (ref 32–36)
MCV RBC AUTO: 98.2 FL — SIGNIFICANT CHANGE UP (ref 80–100)
MONOCYTES # BLD AUTO: 0.64 K/UL — SIGNIFICANT CHANGE UP (ref 0–0.9)
MONOCYTES NFR BLD AUTO: 10.6 % — SIGNIFICANT CHANGE UP (ref 2–14)
NEUTROPHILS # BLD AUTO: 3.4 K/UL — SIGNIFICANT CHANGE UP (ref 1.8–7.4)
NEUTROPHILS NFR BLD AUTO: 56.1 % — SIGNIFICANT CHANGE UP (ref 43–77)
PLATELET # BLD AUTO: 197 K/UL — SIGNIFICANT CHANGE UP (ref 150–400)
POTASSIUM SERPL-MCNC: 3.8 MMOL/L — SIGNIFICANT CHANGE UP (ref 3.5–5.3)
POTASSIUM SERPL-SCNC: 3.8 MMOL/L — SIGNIFICANT CHANGE UP (ref 3.5–5.3)
PROT SERPL-MCNC: 6.7 G/DL — SIGNIFICANT CHANGE UP (ref 6–8.3)
PROTHROM AB SERPL-ACNC: 12 SEC — SIGNIFICANT CHANGE UP (ref 10–13.1)
RBC # BLD: 4.53 M/UL — SIGNIFICANT CHANGE UP (ref 4.2–5.8)
RBC # FLD: 12.6 % — SIGNIFICANT CHANGE UP (ref 10.3–14.5)
SODIUM SERPL-SCNC: 140 MMOL/L — SIGNIFICANT CHANGE UP (ref 135–145)
TSH SERPL-MCNC: 3.23 UIU/ML — SIGNIFICANT CHANGE UP (ref 0.27–4.2)
WBC # BLD: 6.05 K/UL — SIGNIFICANT CHANGE UP (ref 3.8–10.5)
WBC # FLD AUTO: 6.05 K/UL — SIGNIFICANT CHANGE UP (ref 3.8–10.5)

## 2019-11-24 PROCEDURE — 70450 CT HEAD/BRAIN W/O DYE: CPT | Mod: 26,77

## 2019-11-24 PROCEDURE — 70450 CT HEAD/BRAIN W/O DYE: CPT | Mod: 26

## 2019-11-24 RX ADMIN — DONEPEZIL HYDROCHLORIDE 5 MILLIGRAM(S): 10 TABLET, FILM COATED ORAL at 21:18

## 2019-11-24 NOTE — PHYSICAL THERAPY INITIAL EVALUATION ADULT - PLANNED THERAPY INTERVENTIONS, PT EVAL
balance training/bed mobility training/GOAL: Stair Negotiation Training: Patient will be able to negotiate up & down 1 flight of stairs with unilateral rail, step to gait pattern, in 2 weeks./gait training/strengthening/transfer training

## 2019-11-24 NOTE — OCCUPATIONAL THERAPY INITIAL EVALUATION ADULT - SIT-TO-STAND BALANCE
Patient states Dr Goncalves has a form that needs to be signed  Requesting to     Please call pt [phone]   fair minus

## 2019-11-24 NOTE — OCCUPATIONAL THERAPY INITIAL EVALUATION ADULT - PRECAUTIONS/LIMITATIONS, REHAB EVAL
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 IPH.CT Brain stroke(11/23):Acute L temporooccipital parenchymal hemorrhage. No significant mass effect, midline shift, or hydrocephalus.

## 2019-11-24 NOTE — OCCUPATIONAL THERAPY INITIAL EVALUATION ADULT - ADL RETRAINING, OT EVAL
GOAL: Pt will perform upper body dressing independently within 2 weeks. GOAL: Pt will perform toileting independently within 2 weeks.

## 2019-11-24 NOTE — PHYSICAL THERAPY INITIAL EVALUATION ADULT - PERTINENT HX OF CURRENT PROBLEM, REHAB EVAL
74M w/ PMH of HLD, IPH (R. parieto-temporal s/p craniotomy in 2017) presented w/ transient episodes of speech disturbance. Pt had headache & multiple episodes of speech disturbance lasting from 5 minutes to 1 hour.No LOC. Denies changes in vision, hearing, swallowing, voice quality,numbness/tingling, weakness, balance/room-spinning sensations.No AC. Was taken off ASA after last IPH. Continued below.

## 2019-11-24 NOTE — OCCUPATIONAL THERAPY INITIAL EVALUATION ADULT - PERTINENT HX OF CURRENT PROBLEM, REHAB EVAL
74M w/ PMH of HLD, IPH (R. parieto-temporal s/p craniotomy in 2017) presented w/ transient episodes of speech disturbance. Pt had headache & multiple episodes of speech disturbance lasting from 5 minutes to 1 hour.No LOC. Denies changes in vision, hearing, swallowing, voice quality,numbness/tingling, weakness, balance/room-spinning sensations.No AC. Was taken off ASA after last IPH.

## 2019-11-24 NOTE — OCCUPATIONAL THERAPY INITIAL EVALUATION ADULT - BALANCE TRAINING, PT EVAL
GOAL: Pt will increase static and dynamic standing balance to good to increase safety and independence with ADLs within 2 weeks.

## 2019-11-24 NOTE — OCCUPATIONAL THERAPY INITIAL EVALUATION ADULT - DIAGNOSIS, OT EVAL
Pt demonstrated decreased strength, balance, coordination, ?cognition, ?left inattention/ vision, and functional mobility which influences pts functional performance.

## 2019-11-24 NOTE — OCCUPATIONAL THERAPY INITIAL EVALUATION ADULT - LIVES WITH, PROFILE
Pt lives alone in private house, 4 steps to enter +rail, is retired, has both a tub shower and walk in shower, +shower chair, has walker/cane at home, wears bifocal glasses, and is right handed., and was previously independent in all ADLs/IADLs./alone

## 2019-11-25 ENCOUNTER — TRANSCRIPTION ENCOUNTER (OUTPATIENT)
Age: 74
End: 2019-11-25

## 2019-11-25 VITALS
HEART RATE: 53 BPM | RESPIRATION RATE: 18 BRPM | SYSTOLIC BLOOD PRESSURE: 143 MMHG | DIASTOLIC BLOOD PRESSURE: 84 MMHG | OXYGEN SATURATION: 95 % | TEMPERATURE: 98 F

## 2019-11-25 LAB
ANION GAP SERPL CALC-SCNC: 13 MMOL/L — SIGNIFICANT CHANGE UP (ref 5–17)
BUN SERPL-MCNC: 14 MG/DL — SIGNIFICANT CHANGE UP (ref 7–23)
CALCIUM SERPL-MCNC: 9.4 MG/DL — SIGNIFICANT CHANGE UP (ref 8.4–10.5)
CHLORIDE SERPL-SCNC: 104 MMOL/L — SIGNIFICANT CHANGE UP (ref 96–108)
CO2 SERPL-SCNC: 25 MMOL/L — SIGNIFICANT CHANGE UP (ref 22–31)
CREAT SERPL-MCNC: 0.72 MG/DL — SIGNIFICANT CHANGE UP (ref 0.5–1.3)
GLUCOSE SERPL-MCNC: 103 MG/DL — HIGH (ref 70–99)
HCT VFR BLD CALC: 44.7 % — SIGNIFICANT CHANGE UP (ref 39–50)
HGB BLD-MCNC: 14.6 G/DL — SIGNIFICANT CHANGE UP (ref 13–17)
MCHC RBC-ENTMCNC: 32 PG — SIGNIFICANT CHANGE UP (ref 27–34)
MCHC RBC-ENTMCNC: 32.7 GM/DL — SIGNIFICANT CHANGE UP (ref 32–36)
MCV RBC AUTO: 98 FL — SIGNIFICANT CHANGE UP (ref 80–100)
PLATELET # BLD AUTO: 197 K/UL — SIGNIFICANT CHANGE UP (ref 150–400)
POTASSIUM SERPL-MCNC: 4.7 MMOL/L — SIGNIFICANT CHANGE UP (ref 3.5–5.3)
POTASSIUM SERPL-SCNC: 4.7 MMOL/L — SIGNIFICANT CHANGE UP (ref 3.5–5.3)
RBC # BLD: 4.56 M/UL — SIGNIFICANT CHANGE UP (ref 4.2–5.8)
RBC # FLD: 12.6 % — SIGNIFICANT CHANGE UP (ref 10.3–14.5)
SODIUM SERPL-SCNC: 142 MMOL/L — SIGNIFICANT CHANGE UP (ref 135–145)
WBC # BLD: 7.62 K/UL — SIGNIFICANT CHANGE UP (ref 3.8–10.5)
WBC # FLD AUTO: 7.62 K/UL — SIGNIFICANT CHANGE UP (ref 3.8–10.5)

## 2019-11-25 PROCEDURE — 82962 GLUCOSE BLOOD TEST: CPT

## 2019-11-25 PROCEDURE — 70450 CT HEAD/BRAIN W/O DYE: CPT

## 2019-11-25 PROCEDURE — 71045 X-RAY EXAM CHEST 1 VIEW: CPT

## 2019-11-25 PROCEDURE — 99222 1ST HOSP IP/OBS MODERATE 55: CPT | Mod: GC

## 2019-11-25 PROCEDURE — 85610 PROTHROMBIN TIME: CPT

## 2019-11-25 PROCEDURE — G0515: CPT

## 2019-11-25 PROCEDURE — 85027 COMPLETE CBC AUTOMATED: CPT

## 2019-11-25 PROCEDURE — 86900 BLOOD TYPING SEROLOGIC ABO: CPT

## 2019-11-25 PROCEDURE — 93005 ELECTROCARDIOGRAM TRACING: CPT | Mod: 76

## 2019-11-25 PROCEDURE — 99291 CRITICAL CARE FIRST HOUR: CPT | Mod: 25

## 2019-11-25 PROCEDURE — 80048 BASIC METABOLIC PNL TOTAL CA: CPT

## 2019-11-25 PROCEDURE — 80053 COMPREHEN METABOLIC PANEL: CPT

## 2019-11-25 PROCEDURE — 86901 BLOOD TYPING SEROLOGIC RH(D): CPT

## 2019-11-25 PROCEDURE — 70496 CT ANGIOGRAPHY HEAD: CPT

## 2019-11-25 PROCEDURE — 97161 PT EVAL LOW COMPLEX 20 MIN: CPT

## 2019-11-25 PROCEDURE — 83735 ASSAY OF MAGNESIUM: CPT

## 2019-11-25 PROCEDURE — 99233 SBSQ HOSP IP/OBS HIGH 50: CPT

## 2019-11-25 PROCEDURE — 83036 HEMOGLOBIN GLYCOSYLATED A1C: CPT

## 2019-11-25 PROCEDURE — 86850 RBC ANTIBODY SCREEN: CPT

## 2019-11-25 PROCEDURE — 85730 THROMBOPLASTIN TIME PARTIAL: CPT

## 2019-11-25 PROCEDURE — 97165 OT EVAL LOW COMPLEX 30 MIN: CPT

## 2019-11-25 PROCEDURE — 70498 CT ANGIOGRAPHY NECK: CPT

## 2019-11-25 PROCEDURE — 84443 ASSAY THYROID STIM HORMONE: CPT

## 2019-11-25 PROCEDURE — 99292 CRITICAL CARE ADDL 30 MIN: CPT

## 2019-11-25 RX ORDER — ACETAMINOPHEN 500 MG
650 TABLET ORAL EVERY 6 HOURS
Refills: 0 | Status: DISCONTINUED | OUTPATIENT
Start: 2019-11-25 | End: 2019-11-25

## 2019-11-25 RX ADMIN — Medication 650 MILLIGRAM(S): at 08:28

## 2019-11-25 RX ADMIN — Medication 650 MILLIGRAM(S): at 07:58

## 2019-11-25 NOTE — CONSULT NOTE ADULT - ATTENDING COMMENTS
agree with note and plan above  patient states he feels well, states he is at baseline ambulation. Son at bedside agrees.

## 2019-11-25 NOTE — SPEECH LANGUAGE PATHOLOGY EVALUATION - SLP PERTINENT HISTORY OF CURRENT PROBLEM
74M w/ PMH of HLD, Intraparenchymal hemorrhage (IPH) (R. parieto-temporal s/p craniotomy in 2017) presented w/ transient episodes of speech disturbance. Patient woke up in am complaining of headache. Shortly afterwards he had multiple episodes of speech disturbance lasting from 5 minutes to 1 hour. No LOC. No jerking movements. No urinary incontinence. Denies changes in vision, hearing, swallowing, voice quality, numbness/tingling, weakness, balance/room-spinning sensations. No previous history of seizures, migraines. No history of arrythmia. No AC. Was taken off ASA after last IPH.

## 2019-11-25 NOTE — PROGRESS NOTE ADULT - SUBJECTIVE AND OBJECTIVE BOX
THE PATIENT WAS SEEN AND EXAMINED BY ME WITH THE HOUSESTAFF AND STROKE TEAM DURING MORNING ROUNDS.   HPI:  74M w/ PMH of HLD, IPH (R. parieto-temporal s/p craniotomy in 2017) presented w/ transient episodes of speech disturbance.  Patient woke up  morning of admission complaining of headache. Shortly afterwards he had multiple episodes of speech disturbance lasting from 5 minutes to 1 hour. No LOC. No jerking movements. No urinary incontinence. Denied changes in vision, hearing, swallowing, voice quality, numbness/tingling, weakness, balance/room-spinning sensations. No previous history of  seizures, migraines. No history of arrythmia. No AC. Was taken off ASA after last IPH.  CTH demonstrates 1.8 x 1.1 x 1.4 cm TPO parenchymal hemorrhage.  NIHSS: 0 MRS: 0 ICH: 1    SUBJECTIVE: No events overnight.  No new neurologic complaints.      acetaminophen   Tablet .. 650 milliGRAM(s) Oral every 6 hours PRN  atorvastatin Oral Tab/Cap - Peds 40 milliGRAM(s) Oral at bedtime  BromSite 0.075%  Ophthalmic Solution 1 Drop(s) 1 Drop(s) Left EYE at bedtime  donepezil 5 milliGRAM(s) Oral at bedtime  hydrALAZINE Injectable 10 milliGRAM(s) IV Push every 6 hours PRN      PHYSICAL EXAM:   Vital Signs Last 24 Hrs  T(C): 36.6 (25 Nov 2019 07:53), Max: 37 (24 Nov 2019 12:01)  T(F): 97.8 (25 Nov 2019 07:53), Max: 98.6 (24 Nov 2019 12:01)  HR: 53 (25 Nov 2019 07:53) (50 - 90)  BP: 143/84 (25 Nov 2019 07:53) (111/70 - 157/81)  BP(mean): --  RR: 18 (25 Nov 2019 07:53) (18 - 18)  SpO2: 95% (25 Nov 2019 07:53) (94% - 99%)    General: No acute distress  HEENT: EOM intact,    Abdomen: Soft, nontender, nondistended   Extremities: No edema    NEUROLOGICAL EXAM:  Mental status: Awake, alert, oriented x3, no aphasia, no neglect, normal memory   Cranial Nerves: No facial asymmetry, no nystagmus, no dysarthria,  tongue midline  Motor exam: Normal tone, no drift, 5/5 RUE, 5/5 RLE, 5/5 LUE, 5/5 LLE, normal fine finger movements.  Sensation: Intact to light touch   Coordination/ Gait: No dysmetria, GERMÁN intact and symmetric bilaterally    LABS:                        14.6   7.62  )-----------( 197      ( 25 Nov 2019 07:53 )             44.7    11-25    142  |  104  |  14  ----------------------------<  103<H>  4.7   |  25  |  0.72    Ca    9.4      25 Nov 2019 05:55  Mg     2.2     11-24    TPro  6.7  /  Alb  4.1  /  TBili  0.4  /  DBili  x   /  AST  15  /  ALT  12  /  AlkPhos  52  11-24  PT/INR - ( 24 Nov 2019 08:56 )   PT: 12.0 sec;   INR: 1.04 ratio         PTT - ( 24 Nov 2019 08:56 )  PTT:29.6 sec  Hemoglobin A1C, Whole Blood: 5.4 % (11-24 @ 09:05)      IMAGING: Reviewed by me.   CT Head No Cont (11.24.19)  Similar 1.3 x 1.7 cm left occipitotemporal parenchymal hemorrhage.    11.23.19   CTA Neck: Mild atherosclerosis without significant flow-limiting stenosis   or evidence of acute dissection within the cervical carotid or vertebral   arteries.    CTA Head: No proximallarge vessel occlusion.     Left temporal occipital parenchymal hemorrhage as described on   noncontrast CT head performed concurrently.       CT Brain Stroke Protocol (11.23.19)  Acute 1.8 x 1.1 x 1.4 cm left temporooccipital parenchymal hemorrhage. No   significant mass effect, midline shift, or hydrocephalus. THE PATIENT WAS SEEN AND EXAMINED BY ME WITH THE HOUSESTAFF AND STROKE TEAM DURING MORNING ROUNDS.   HPI:  74M w/ PMH of HLD, IPH (R. parieto-temporal s/p craniotomy in 2017) presented w/ transient episodes of speech disturbance.  Patient woke up  morning of admission complaining of headache. Shortly afterwards he had multiple episodes of speech disturbance lasting from 5 minutes to 1 hour. No LOC. No jerking movements. No urinary incontinence. Denied changes in vision, hearing, swallowing, voice quality, numbness/tingling, weakness, balance/room-spinning sensations. No previous history of  seizures, migraines. No history of arrythmia. No AC. Was taken off ASA after last IPH.  CTH demonstrates 1.8 x 1.1 x 1.4 cm TPO parenchymal hemorrhage.  NIHSS: 0 MRS: 0 ICH: 1    SUBJECTIVE: No events overnight.  No new neurologic complaints.      acetaminophen   Tablet .. 650 milliGRAM(s) Oral every 6 hours PRN  atorvastatin Oral Tab/Cap - Peds 40 milliGRAM(s) Oral at bedtime  BromSite 0.075%  Ophthalmic Solution 1 Drop(s) 1 Drop(s) Left EYE at bedtime  donepezil 5 milliGRAM(s) Oral at bedtime  hydrALAZINE Injectable 10 milliGRAM(s) IV Push every 6 hours PRN      PHYSICAL EXAM:   Vital Signs Last 24 Hrs  T(C): 36.6 (25 Nov 2019 07:53), Max: 37 (24 Nov 2019 12:01)  T(F): 97.8 (25 Nov 2019 07:53), Max: 98.6 (24 Nov 2019 12:01)  HR: 53 (25 Nov 2019 07:53) (50 - 90)  BP: 143/84 (25 Nov 2019 07:53) (111/70 - 157/81)  BP(mean): --  RR: 18 (25 Nov 2019 07:53) (18 - 18)  SpO2: 95% (25 Nov 2019 07:53) (94% - 99%)    General: No acute distress  HEENT: EOM intact,  LHH  Abdomen: Soft, nontender, nondistended   Extremities: No edema    NEUROLOGICAL EXAM:  Mental status: Awake, alert, oriented x3, no aphasia, no neglect, normal memory   Cranial Nerves: trace left nasolabial fold flattening, no nystagmus, no dysarthria,  tongue midline  Motor exam: moves extremities well against gravity  Sensation: Intact to light touch   Coordination/ Gait: No dysmetria     LABS:                        14.6   7.62  )-----------( 197      ( 25 Nov 2019 07:53 )             44.7    11-25    142  |  104  |  14  ----------------------------<  103<H>  4.7   |  25  |  0.72    Ca    9.4      25 Nov 2019 05:55  Mg     2.2     11-24    TPro  6.7  /  Alb  4.1  /  TBili  0.4  /  DBili  x   /  AST  15  /  ALT  12  /  AlkPhos  52  11-24  PT/INR - ( 24 Nov 2019 08:56 )   PT: 12.0 sec;   INR: 1.04 ratio         PTT - ( 24 Nov 2019 08:56 )  PTT:29.6 sec  Hemoglobin A1C, Whole Blood: 5.4 % (11-24 @ 09:05)      IMAGING: Reviewed by me.   CT Head No Cont (11.24.19)  Similar 1.3 x 1.7 cm left occipitotemporal parenchymal hemorrhage.    11.23.19   CTA Neck: Mild atherosclerosis without significant flow-limiting stenosis   or evidence of acute dissection within the cervical carotid or vertebral   arteries.    CTA Head: No proximallarge vessel occlusion.   Left temporal occipital parenchymal hemorrhage as described on   noncontrast CT head performed concurrently.    CT Brain Stroke Protocol (11.23.19)  Acute 1.8 x 1.1 x 1.4 cm left temporooccipital parenchymal hemorrhage. No   significant mass effect, midline shift, or hydrocephalus.

## 2019-11-25 NOTE — DISCHARGE NOTE NURSING/CASE MANAGEMENT/SOCIAL WORK - PATIENT PORTAL LINK FT
You can access the FollowMyHealth Patient Portal offered by [facility] by registering at the following website: [email or web address]. By joining [employer]’s FollowMyHealth portal, you will also be able to view your health information using other applications (apps) compatible with our system.

## 2019-11-25 NOTE — DISCHARGE NOTE PROVIDER - CARE PROVIDER_API CALL
Alfredo Harmon)  Neurology; Vascular Neurology  [address]  [place] [address]  Phone: [phone]  Fax: [phone]  Follow Up Time: 2 weeks    Edwin Crowley)  Internal Medicine  [address]  [place] [address]  Phone: [phone]  Fax: [phone]  Follow Up Time: 2 weeks

## 2019-11-25 NOTE — DISCHARGE NOTE PROVIDER - PROVIDER TOKENS
PROVIDER:[TOKEN:[[id number]],FOLLOWUP:[2 weeks]],PROVIDER:[TOKEN:[[id number]],FOLLOWUP:[2 weeks]]

## 2019-11-25 NOTE — CONSULT NOTE ADULT - SUBJECTIVE AND OBJECTIVE BOX
History of Present Illness: 	  74M w/ PMH of HLD, IPH (R. parieto-temporal s/p craniotomy in 2017) presented w/ transient episodes of speech disturbance/visual disturbance      Patient woke up this morning complaining of headache. Shortly afterwards he had multiple episodes of speech disturbance lasting from 5 minutes to 1 hour. No LOC. No jerking movements. No urinary incontinence. Denies changes in vision, hearing, swallowing, voice quality, numbness/tingling, weakness, balance/room-spinning sensations. No previous history of  seizures, migraines. No history of arrythmia. No AC. Was taken off ASA after last IPH.   ROS- hyperlidipemia,   abnormal ecg     Patient is a 74y old  Male who presents with a chief complaint of Stroke code (23 Nov 2019 20:18)      INTERVAL HPI/OVERNIGHT EVENTS:    MEDICATIONS  (STANDING):  atorvastatin Oral Tab/Cap - Peds 40 milliGRAM(s) Oral at bedtime  BromSite 0.075%  Ophthalmic Solution 1 Drop(s) 1 Drop(s) Left EYE at bedtime  donepezil 5 milliGRAM(s) Oral at bedtime    MEDICATIONS  (PRN):  acetaminophen   Tablet .. 650 milliGRAM(s) Oral every 6 hours PRN Mild Pain (1 - 3)  hydrALAZINE Injectable 10 milliGRAM(s) IV Push every 6 hours PRN Systolic/Diastolic >160/90            Allergies    No Known Allergies    Intolerances        REVIEW OF SYSTEMS:  CARDIOVASCULAR: No chest pain, palpitations, dizziness, or leg swelling; no shortness of breath     RESPIRATORY: No cough, wheezing, chills or hemoptysis; No shortness of breath    GASTROINTESTINAL: No abdominal or epigastric pain. No nausea, vomiting, or hematemesis; No diarrhea or constipation. No melena or hematochezia.    NEUROLOGICAL: No headaches, memory loss, loss of strength, numbness      PHYSICAL EXAM:  Vital Signs Last 24 Hrs  T(C): 36.6 (25 Nov 2019 07:53), Max: 37 (24 Nov 2019 12:01)  T(F): 97.8 (25 Nov 2019 07:53), Max: 98.6 (24 Nov 2019 12:01)  HR: 53 (25 Nov 2019 07:53) (50 - 90)  BP: 143/84 (25 Nov 2019 07:53) (111/70 - 157/81)  BP(mean): --  RR: 18 (25 Nov 2019 07:53) (18 - 18)  SpO2: 95% (25 Nov 2019 07:53) (94% - 99%)    GENERAL: NAD, well-groomed, well-developed  HEAD:  Atraumatic, Normocephalic  EYES: EOMI, PERRLA, conjunctiva and sclera clear  NECK: Supple, No JVD, Normal thyroid  NERVOUS SYSTEM:  Alert & Oriented X3, Good concentration;  and symmetric  speech fluent.  Moves all extremities   CHEST/LUNG: Clear to auscultation bilaterally; No rales, rhonchi, wheezing, or rubs  HEART: S1S2 regular, without murmur, rub nor gallop  ABDOMEN: Soft, Nontender, Nondistended; Bowel sounds present  EXTREMITIES:  2+ Peripheral Pulses, No clubbing, cyanosis, or edema      LABS:                        14.6   6.05  )-----------( 197      ( 24 Nov 2019 09:11 )             44.5     25 Nov 2019 05:55    142    |  104    |  14     ----------------------------<  103    4.7     |  25     |  0.72     Ca    9.4        25 Nov 2019 05:55      PT/INR - ( 24 Nov 2019 08:56 )   PT: 12.0 sec;   INR: 1.04 ratio         PTT - ( 24 Nov 2019 08:56 )  PTT:29.6 sec  CAPILLARY BLOOD GLUCOSE    < from: CT Head No Cont (11.24.19 @ 07:01) >  No significant interval change.    Similar 1.3 x 1.7 cm left occipitotemporal parenchymal hemorrhage.    < end of copied text >              assessment:  cva/intraparenchymal hemorrhage secondary amyloid angiopathy    Plan:   per neuro.   CT scan stable.   Continue statin.  PT.

## 2019-11-25 NOTE — DISCHARGE NOTE PROVIDER - NSDCCPCAREPLAN_GEN_ALL_CORE_FT
PRINCIPAL DISCHARGE DIAGNOSIS  Diagnosis: Cerebral parenchymal hemorrhage  Assessment and Plan of Treatment: Please follow up with neurologist in 1 week. Continue taking medications as prescribed. Monitor your blood pressure. Reduce fat, cholesterol and salt in your diet. Increase intake of fruits and vegetables. Limit alcohol to minimum and do not smoke. You may be at risk for falling, make changes to your home to help you walk easier. Keep up to date on vaccinations.  If you experience any symptoms of facial drooping, slurred speech, arm or leg weakness, severe headache, vision changes or any worsening symptoms, notify provider immediatley and return to ER.

## 2019-11-25 NOTE — CONSULT NOTE ADULT - SUBJECTIVE AND OBJECTIVE BOX
CC: Evaluation of Rehabilitation Needs  HPI:  74M w/ PMH of HLD, IPH (R. parieto-temporal s/p craniotomy in 2017) presented w/ transient episodes of speech disturbance. Patient woke up in the morning complaining of headache. Shortly afterwards he had multiple episodes of speech disturbance lasting from 5 minutes to 1 hour. No LOC. No jerking movements. No urinary incontinence. Denied changes in vision, hearing, swallowing, voice quality, numbness/tingling, weakness, balance/room-spinning sensations. No previous history of  seizures, migraines. No history of arrythmia. No AC. Was taken off ASA after last IPH.  In the ED CTH demonstrates 1.8 x 1.1 x 1.4 cm TPO parenchymal hemorrhage. He was admitted. Repeat imaging has been stable.      REVIEW OF SYSTEMS  Constitutional - No fever, No fatigue  HEENT - No visual disturbances, No difficulty hearing,  No neck pain  Respiratory - No cough, No wheezing, No shortness of breath  Cardiovascular - No chest pain, No palpitations  Gastrointestinal - No abdominal pain, No nausea, No vomiting, No diarrhea, No constipation  Genitourinary - No dysuria, No frequency, No hematuria, No incontinence  Neurological - No headaches, No numbness  Skin - No itching, No rashes  Musculoskeletal - No joint pain, No joint swelling, No muscle pain  Psychiatric - No depression, No anxiety  All other review of systems negative    PAST MEDICAL & SURGICAL HISTORY  Herniated disc, cervical  BPH (benign prostatic hypertrophy)  GERD (gastroesophageal reflux disease)  Hyperlipidemia  Hemorrhagic stroke  S/P angioplasty  IH (inguinal hernia)  After cataract      SOCIAL HISTORY  Smoking - Denied  EtOH - Denied   Drugs - Denied    FUNCTIONAL HISTORY  _______ hand dominant  Pt lives alone in a house with 4-5 steps to enter, +HR and 1 flight of stairs to the basement, +HR.   Pt was independent with all ADLs and ambulation PTA. Pt did not use a AD for ambulation PTA.   Pt owns RW, cane and shower chair. Pt with 1st floor setup. Pt is R hand dominant. +Bifocal eyeglasses.    CURRENT FUNCTIONAL STATUS  Bed mobility -   Transfers - Contact Guard  Gait - 50 feet minimum assistance   ADL's -Lower Body Dressing-Independent    FAMILY HISTORY   Reviewed and non-contributory    ALLERGIES  No Known Allergies    VITALS  T(C): 36.6 (11-25-19 @ 07:53)  T(F): 97.8 (11-25-19 @ 07:53), Max: 98.6 (11-24-19 @ 12:01)  HR: 53 (11-25-19 @ 07:53) (50 - 90)  BP: 143/84 (11-25-19 @ 07:53) (111/70 - 157/81)  RR:  (18 - 18)  SpO2:  (94% - 99%)      PHYSICAL EXAM  Constitutional - NAD, Comfortable  HEENT - NCAT, EOMI  Neck - Supple  Chest - CTA bilaterally  Cardiovascular - RRR, S1S2  Abdomen -  Soft, NTND  Extremities - No C/C/E, No calf tenderness   Neurologic Exam -                    Cognitive - Awake, Alert, AAO to self, place, date, year, situation     Communication - Fluent, No dysarthria     Cranial Nerves - CN 2-12 intact     Motor -                     LEFT    UE - ShAB 5/5, EF 5/5, EE 5/5, WE 5/5,  WNL                    RIGHT UE - ShAB 5/5, EF 5/5, EE 5/5, WE 5/5,  WNL                    LEFT    LE - HF 5/5, KE 5/5, DF 5/5, PF 5/5                    RIGHT LE - HF 5/5, KE 5/5, DF 5/5, PF 5/5        Sensory - Intact to light touch     Reflexes - DTR Intact, No primitive reflexive, Tay's neg b/l, Babinski's neg b/l     Coordination - FTN intact     OculoVestibular - No saccades, No nystagmus, VOR         Balance - WNL Static  Psychiatric - Affect WNL    RECENT LABS/IMAGING                        14.6   7.62  )-----------( 197      ( 25 Nov 2019 07:53 )             44.7     11-25    142  |  104  |  14  ----------------------------<  103<H>  4.7   |  25  |  0.72    Ca    9.4      25 Nov 2019 05:55  Mg     2.2     11-24    TPro  6.7  /  Alb  4.1  /  TBili  0.4  /  DBili  x   /  AST  15  /  ALT  12  /  AlkPhos  52  11-24    PT/INR - ( 24 Nov 2019 08:56 )   PT: 12.0 sec;   INR: 1.04 ratio         PTT - ( 24 Nov 2019 08:56 )  PTT:29.6 sec    < from: CT Angio Head w/ IV Cont (11.23.19 @ 19:38) >  CTA Neck: Mild atherosclerosis without significant flow-limiting stenosis   or evidence of acute dissection within the cervical carotid or vertebral   arteries.    CTA Head: No proximallarge vessel occlusion.     Left temporal occipital parenchymal hemorrhage as described on   noncontrast CT head performed concurrently.    < from: CT Head No Cont (11.24.19 @ 07:01) >  Similar 1.3 x 1.7 cm left occipitotemporal parenchymal hemorrhage.        MEDICATIONS   MEDICATIONS  (STANDING):  atorvastatin Oral Tab/Cap - Peds 40 milliGRAM(s) Oral at bedtime  BromSite 0.075%  Ophthalmic Solution 1 Drop(s) 1 Drop(s) Left EYE at bedtime  donepezil 5 milliGRAM(s) Oral at bedtime    MEDICATIONS  (PRN):  acetaminophen   Tablet .. 650 milliGRAM(s) Oral every 6 hours PRN Mild Pain (1 - 3)  hydrALAZINE Injectable 10 milliGRAM(s) IV Push every 6 hours PRN Systolic/Diastolic >160/90      ASSESSMENT/PLAN  73 y/o male with left occipitotemporal parenchymal hemorrhage, with functional, gait, and ADL impairments.    Disposition -  PT - ROM, Bed Mob, Transfers, Amb with AD   OT - ADLs, ROM  SLP - Dysphagia eval and treat  Precautions - Falls, Cardiac    DVT Prophylaxis -SCD  Weight bearing status -WBAT  Skin - Turn Q2hrs  Diet - DASH low cholesterol low sodium CC: Evaluation of Rehabilitation Needs  HPI:  74M w/ PMH of HLD, IPH (R. parieto-temporal s/p craniotomy in 2017) presented w/ transient episodes of speech disturbance. Patient woke up in the morning complaining of headache. Shortly afterwards he had multiple episodes of speech disturbance lasting from 5 minutes to 1 hour. No LOC. No jerking movements. No urinary incontinence. Denied changes in vision, hearing, swallowing, voice quality, numbness/tingling, weakness, balance/room-spinning sensations. No previous history of  seizures, migraines. No history of arrythmia. No AC. Was taken off ASA after last IPH.  In the ED CTH demonstrates 1.8 x 1.1 x 1.4 cm TPO parenchymal hemorrhage. He was admitted. Repeat imaging has been stable.      REVIEW OF SYSTEMS  Constitutional - No fever, No fatigue  HEENT - bilateral left homonymous hemianopsia, No difficulty hearing,  No neck pain  Respiratory - No cough, No wheezing, No shortness of breath  Cardiovascular - No chest pain, No palpitations  Gastrointestinal - No abdominal pain, No nausea, No vomiting, No diarrhea, No constipation  Genitourinary - No dysuria, No frequency, No hematuria, No incontinence  Neurological - No headaches, No numbness  Skin - No itching, No rashes  Musculoskeletal - No joint pain, No joint swelling, No muscle pain  Psychiatric - No depression, No anxiety  All other review of systems negative    PAST MEDICAL & SURGICAL HISTORY  Herniated disc, cervical  BPH (benign prostatic hypertrophy)  GERD (gastroesophageal reflux disease)  Hyperlipidemia  Hemorrhagic stroke  S/P angioplasty  IH (inguinal hernia)  After cataract      SOCIAL HISTORY  Smoking - Denied  EtOH - Denied   Drugs - Denied    FUNCTIONAL HISTORY  Right hand dominant  Pt lives alone in a house with 4-5 steps to enter, +HR and 1 flight of stairs to the basement, +HR.   Pt was independent with all ADLs and ambulation PTA. Pt did not use a AD for ambulation PTA.   Pt owns RW, cane and shower chair. Pt with 1st floor setup. Pt is R hand dominant. +Bifocal eyeglasses.    CURRENT FUNCTIONAL STATUS  Transfers - Independent  Gait - 200 feet Independent and 9 stairs with hand rail Independent  ADL's -Lower Body Dressing-Independent    FAMILY HISTORY   Reviewed and non-contributory    ALLERGIES  No Known Allergies    VITALS  T(C): 36.6 (11-25-19 @ 07:53)  T(F): 97.8 (11-25-19 @ 07:53), Max: 98.6 (11-24-19 @ 12:01)  HR: 53 (11-25-19 @ 07:53) (50 - 90)  BP: 143/84 (11-25-19 @ 07:53) (111/70 - 157/81)  RR:  (18 - 18)  SpO2:  (94% - 99%)      PHYSICAL EXAM  Constitutional - NAD, Comfortable  HEENT - NCAT, EOMI, bilateral left homonymous hemianopsia  Neck - Supple  Chest - CTA bilaterally  Cardiovascular - RRR, S1S2  Abdomen -  Soft, NTND  Extremities - No C/C/E, No calf tenderness   Neurologic Exam -                    Cognitive - Awake, Alert, AAO to self, place, date, year, situation     Communication - Fluent, No dysarthria     Cranial Nerves - CN 2-12 intact     Motor -                     LEFT    UE - ShAB 4+/5, EF 5/5, EE 5/5, WE 5/5,  WNL                    RIGHT UE - ShAB 5/5, EF 5/5, EE 5/5, WE 5/5,  WNL                    LEFT    LE - HF 4+/5, KE 5/5, DF 5/5, PF 5/5                    RIGHT LE - HF 4+/5, KE 5/5, DF 5/5, PF 5/5        Sensory - Intact to light touch     Reflexes - DTR Intact, No primitive reflexive, Tay's neg b/l, Babinski's neg b/l     Coordination - FTN intact     OculoVestibular - No saccades, No nystagmus, VOR         Balance - WNL Static  Psychiatric - Affect WNL    RECENT LABS/IMAGING                        14.6   7.62  )-----------( 197      ( 25 Nov 2019 07:53 )             44.7     11-25    142  |  104  |  14  ----------------------------<  103<H>  4.7   |  25  |  0.72    Ca    9.4      25 Nov 2019 05:55  Mg     2.2     11-24    TPro  6.7  /  Alb  4.1  /  TBili  0.4  /  DBili  x   /  AST  15  /  ALT  12  /  AlkPhos  52  11-24    PT/INR - ( 24 Nov 2019 08:56 )   PT: 12.0 sec;   INR: 1.04 ratio         PTT - ( 24 Nov 2019 08:56 )  PTT:29.6 sec    < from: CT Angio Head w/ IV Cont (11.23.19 @ 19:38) >  CTA Neck: Mild atherosclerosis without significant flow-limiting stenosis   or evidence of acute dissection within the cervical carotid or vertebral   arteries.    CTA Head: No proximallarge vessel occlusion.     Left temporal occipital parenchymal hemorrhage as described on   noncontrast CT head performed concurrently.    < from: CT Head No Cont (11.24.19 @ 07:01) >  Similar 1.3 x 1.7 cm left occipitotemporal parenchymal hemorrhage.    < from: CT Head No Cont (11.24.19 @ 20:11) >  No significant interval change in the 1.3 x 1.7 left occipitotemporal   intraparenchymal hemorrhage, volume loss, microvascular disease age   indeterminate lacunar infarcts, if symptoms persist consider follow-up   head CT or MR if no contraindications.      MEDICATIONS   MEDICATIONS  (STANDING):  atorvastatin Oral Tab/Cap - Peds 40 milliGRAM(s) Oral at bedtime  BromSite 0.075%  Ophthalmic Solution 1 Drop(s) 1 Drop(s) Left EYE at bedtime  donepezil 5 milliGRAM(s) Oral at bedtime    MEDICATIONS  (PRN):  acetaminophen   Tablet .. 650 milliGRAM(s) Oral every 6 hours PRN Mild Pain (1 - 3)  hydrALAZINE Injectable 10 milliGRAM(s) IV Push every 6 hours PRN Systolic/Diastolic >160/90      ASSESSMENT/PLAN  75 y/o male with left occipitotemporal parenchymal hemorrhage, with functional, gait, and ADL impairments.    Disposition -home with outpatient PT and outpatient OT  PT - ROM, Bed Mob, Transfers, Amb with AD   OT - ADLs, ROM  SLP - Dysphagia eval and treat  Precautions - Falls, Cardiac    DVT Prophylaxis -SCD  Weight bearing status -WBAT  Skin - Turn Q2hrs  Diet - DASH low cholesterol low sodium CC: changes in speech    HPI:  74M w/ PMH of HLD, IPH (R. parieto-temporal s/p craniotomy in 2017) presented w/ transient episodes of speech disturbance. Patient woke up in the morning complaining of headache. Shortly afterwards he had multiple episodes of speech disturbance lasting from 5 minutes to 1 hour. No LOC. No jerking movements. No urinary incontinence. Denied changes in vision, hearing, swallowing, voice quality, numbness/tingling, weakness, balance/room-spinning sensations. No previous history of  seizures, migraines. No history of arrythmia. No AC. Was taken off ASA after last IPH.  In the ED CTH demonstrates 1.8 x 1.1 x 1.4 cm TPO parenchymal hemorrhage. He was admitted. Repeat imaging has been stable.      REVIEW OF SYSTEMS  Constitutional - No fever, No fatigue  HEENT - bilateral left homonymous hemianopsia, No difficulty hearing,  No neck pain  Respiratory - No cough, No wheezing, No shortness of breath  Cardiovascular - No chest pain, No palpitations  Gastrointestinal - No abdominal pain, No nausea, No vomiting, No diarrhea, No constipation  Genitourinary - No dysuria, No frequency, No hematuria, No incontinence  Neurological - No headaches, No numbness  Skin - No itching, No rashes  Musculoskeletal - No joint pain, No joint swelling, No muscle pain  Psychiatric - No depression, No anxiety  All other review of systems negative    PAST MEDICAL & SURGICAL HISTORY  Herniated disc, cervical  BPH (benign prostatic hypertrophy)  GERD (gastroesophageal reflux disease)  Hyperlipidemia  Hemorrhagic stroke  S/P angioplasty  IH (inguinal hernia)  After cataract      SOCIAL HISTORY  Smoking - Denied  EtOH - Denied   Drugs - Denied    FUNCTIONAL HISTORY  Right hand dominant  Pt lives alone in a house with 4-5 steps to enter, +HR and 1 flight of stairs to the basement, +HR.   Pt was independent with all ADLs and ambulation PTA. Pt did not use a AD for ambulation PTA.   Pt owns RW, cane and shower chair. Pt with 1st floor setup. Pt is R hand dominant. +Bifocal eyeglasses.    CURRENT FUNCTIONAL STATUS  Transfers - Independent  Gait - 200 feet Independent and 9 stairs with hand rail Independent  ADL's -Lower Body Dressing-Independent    FAMILY HISTORY   Reviewed and non-contributory    ALLERGIES  No Known Allergies    VITALS  T(C): 36.6 (11-25-19 @ 07:53)  T(F): 97.8 (11-25-19 @ 07:53), Max: 98.6 (11-24-19 @ 12:01)  HR: 53 (11-25-19 @ 07:53) (50 - 90)  BP: 143/84 (11-25-19 @ 07:53) (111/70 - 157/81)  RR:  (18 - 18)  SpO2:  (94% - 99%)      PHYSICAL EXAM  Constitutional - NAD, Comfortable  HEENT - NCAT, EOMI, bilateral left homonymous hemianopsia  Neck - Supple  Chest - CTA bilaterally  Cardiovascular - RRR, S1S2  Abdomen -  Soft, NTND  Extremities - No C/C/E, No calf tenderness   Neurologic Exam -                    Cognitive - Awake, Alert, AAO to self, place, date, year, situation     Communication - Fluent, No dysarthria     Cranial Nerves - CN 2-12 intact     Motor -                     LEFT    UE - ShAB 4+/5, EF 5/5, EE 5/5, WE 5/5,  WNL                    RIGHT UE - ShAB 5/5, EF 5/5, EE 5/5, WE 5/5,  WNL                    LEFT    LE - HF 4+/5, KE 5/5, DF 5/5, PF 5/5                    RIGHT LE - HF 4+/5, KE 5/5, DF 5/5, PF 5/5        Sensory - Intact to light touch     Reflexes - DTR Intact, No primitive reflexive, Tay's neg b/l, Babinski's neg b/l     Coordination - FTN intact     OculoVestibular - No saccades, No nystagmus, VOR         Balance - WNL Static  Psychiatric - Affect WNL    RECENT LABS/IMAGING                        14.6   7.62  )-----------( 197      ( 25 Nov 2019 07:53 )             44.7     11-25    142  |  104  |  14  ----------------------------<  103<H>  4.7   |  25  |  0.72    Ca    9.4      25 Nov 2019 05:55  Mg     2.2     11-24    TPro  6.7  /  Alb  4.1  /  TBili  0.4  /  DBili  x   /  AST  15  /  ALT  12  /  AlkPhos  52  11-24    PT/INR - ( 24 Nov 2019 08:56 )   PT: 12.0 sec;   INR: 1.04 ratio         PTT - ( 24 Nov 2019 08:56 )  PTT:29.6 sec    < from: CT Angio Head w/ IV Cont (11.23.19 @ 19:38) >  CTA Neck: Mild atherosclerosis without significant flow-limiting stenosis   or evidence of acute dissection within the cervical carotid or vertebral   arteries.    CTA Head: No proximallarge vessel occlusion.     Left temporal occipital parenchymal hemorrhage as described on   noncontrast CT head performed concurrently.    < from: CT Head No Cont (11.24.19 @ 07:01) >  Similar 1.3 x 1.7 cm left occipitotemporal parenchymal hemorrhage.    < from: CT Head No Cont (11.24.19 @ 20:11) >  No significant interval change in the 1.3 x 1.7 left occipitotemporal   intraparenchymal hemorrhage, volume loss, microvascular disease age   indeterminate lacunar infarcts, if symptoms persist consider follow-up   head CT or MR if no contraindications.      MEDICATIONS   MEDICATIONS  (STANDING):  atorvastatin Oral Tab/Cap - Peds 40 milliGRAM(s) Oral at bedtime  BromSite 0.075%  Ophthalmic Solution 1 Drop(s) 1 Drop(s) Left EYE at bedtime  donepezil 5 milliGRAM(s) Oral at bedtime    MEDICATIONS  (PRN):  acetaminophen   Tablet .. 650 milliGRAM(s) Oral every 6 hours PRN Mild Pain (1 - 3)  hydrALAZINE Injectable 10 milliGRAM(s) IV Push every 6 hours PRN Systolic/Diastolic >160/90      ASSESSMENT/PLAN  75 y/o male with left occipitotemporal parenchymal hemorrhage, with functional, gait, and ADL impairments.    Disposition -home with outpatient PT and outpatient OT  PT - ROM, Bed Mob, Transfers, Amb with AD   OT - ADLs, ROM  SLP - Dysphagia eval and treat  Precautions - Falls, Cardiac    DVT Prophylaxis -SCD  Weight bearing status -WBAT  Skin - Turn Q2hrs  Diet - DASH low cholesterol low sodium

## 2019-11-25 NOTE — DISCHARGE NOTE PROVIDER - NSDCMRMEDTOKEN_GEN_ALL_CORE_FT
atorvastatin 40 mg oral tablet: 1 tab(s) orally once a day  donepezil 5 mg oral tablet: 1 tab(s) orally once a day (at bedtime) atorvastatin 40 mg oral tablet: 1 tab(s) orally once a day  donepezil 5 mg oral tablet: 1 tab(s) orally once a day (at bedtime)  Occupational Therapy: Dx: Stroke  Physical Therapy: Dx: Stroke

## 2019-11-25 NOTE — DISCHARGE NOTE NURSING/CASE MANAGEMENT/SOCIAL WORK - NSDCPEPTSTRK_GEN_ALL_CORE
Prescribed medications/Risk factors for stroke/Stroke education booklet/Stroke warning signs and symptoms/Signs and symptoms of stroke/Stroke support groups for patients, families, and friends/Call 911 for stroke/Need for follow up after discharge

## 2019-11-25 NOTE — PROGRESS NOTE ADULT - ASSESSMENT
ASSESSMENT: 74M w/ PMH of HLD, IPH (R. parieto-temporal s/p craniotomy in 2017)  initially evaluated at [facility] for acute onset of headache and left sided weakness since 8/8/17. CT brain on admission showed right tempo-parieto-occipital ICH with surrounding vasogenic edema leading to mass effect and brain compression. He subsequently underwent craniectomy and hematoma evacuation. MRI brain showed stable right parietal occipital ICH with surrounding vasogenic edema and minimal IVH, Pathology confirmed CAA.     Impression: Nontraumatic left hemispheric cortically located ICH. Chronic Right tempo-parieto-occipital ICH - likely etiology being possible cerebral amyloid angiopathy. Associated vasogenic edema leading to mass effect and brain compression.     NEURO: neurologically without acute change, continue close monitoring for neurologic deterioration as patient with cerebral edema, mass effect, and brain compression, maintain normotension with SBP goal < 160mmHg for now, home statin if applicable. Physical therapy/OT: AR      ANTITHROMBOTIC THERAPY: none in setting of ICH    PULMONARY: CXR with atelectasis, encourage mobility and incentive spirometry, protecting airway, saturating well     CARDIOVASCULAR:  TTE 8/9/17 EF 65%, mild MR, mild TR, cardiac monitoring with no acute events, titrate hypertensive regimen accordingly                             SBP goal: <140mmhg     GASTROINTESTINAL:  dysphagia screen passed, tolerating diet     Diet: Regular     RENAL: BUN/Cr within range, maintain adequate hydration, good urine output      Na Goal: Greater than 135     Brice: n     HEMATOLOGY: H/H without acute change, no active bleeidng, Platelets 197     DVT ppx: Heparin s.c [] LMWH []     ID: afebrile, no leukocytosis     OTHER:     DISPOSITION: Rehab or home depending on PT eval once stable and workup is complete      CORE MEASURES:        Admission NIHSS: 0     TPA: [] YES [x] NO      LDL/HDL: p     Depression Screen: p     Statin Therapy: home if applicable      Dysphagia Screen: [x] PASS [] FAIL     Smoking [] YES [x] NO      Afib [] YES [x] NO     Stroke Education [x] YES [] NO    Obtain screening lower extremity venous ultrasound in patients who meet 1 or more of the following criteria as patient is high risk for DVT/PE on admission:   [] History of DVT/PE  []Hypercoagulable states (Factor V Leiden, Cancer, OCP, etc. )  []Prolonged immobility (hemiplegia/hemiparesis/post operative or any other extended immobilization)  [] Transferred from outside facility (Rehab or Long term care)  [] Age </= to 50 ASSESSMENT: 74M w/ PMH of HLD, IP (R. parieto-temporal s/p craniotomy in 2017)  initially evaluated at [facility] for acute onset of headache and left sided weakness since 8/8/17. CT brain on admission showed right tempo-parieto-occipital ICH with surrounding vasogenic edema leading to mass effect and brain compression. He subsequently underwent craniectomy and hematoma evacuation. MRI brain showed stable right parietal occipital ICH with surrounding vasogenic edema and minimal IVH, Pathology confirmed CAA.     Impression: Nontraumatic left hemispheric cortically located ICH. Chronic Right tempo-parieto-occipital ICH - likely etiology being possible cerebral amyloid angiopathy. Associated vasogenic edema leading to mass effect and brain compression.     NEURO: neurologically without acute change, neurological monitoring as patient with cerebral edema, mass effect, and brain compression, maintain normotension with SBP goal < 140mmHg for now, home statin if applicable. Physical therapy/OT: AR      ANTITHROMBOTIC THERAPY: none in setting of ICH, increased risk of bleeding in setting of CAA, no neurological indication    PULMONARY: CXR with atelectasis, encourage mobility and incentive spirometry, protecting airway, saturating well     CARDIOVASCULAR:  TTE 8/9/17 EF 65%, mild MR, mild TR, cardiac monitoring with no acute events, titrate hypertensive regimen accordingly                             SBP goal: <140mmhg     GASTROINTESTINAL:  dysphagia screen passed, tolerating diet     Diet: Regular     RENAL: BUN/Cr within range, maintain adequate hydration, good urine output      Na Goal: Greater than 135     Brice: n     HEMATOLOGY: H/H without acute change, no active bleeding Platelets 197     DVT ppx: Heparin s.c [] LMWH [x]-48 hours post hemorrhage pending clinical and radiological stability     ID: afebrile, no leukocytosis     OTHER: current condition and plan d/w patient and family at bedside, questions and concerns addressed.    DISPOSITION: Rehab or home depending on PT eval.       CORE MEASURES:        Admission NIHSS: 0     TPA: [] YES [x] NO      LDL/HDL: p     Depression Screen: 0     Statin Therapy: home if applicable      Dysphagia Screen: [x] PASS [] FAIL     Smoking [] YES [x] NO      Afib [] YES [x] NO     Stroke Education [x] YES [] NO    Obtain screening lower extremity venous ultrasound in patients who meet 1 or more of the following criteria as patient is high risk for DVT/PE on admission:   [] History of DVT/PE  []Hypercoagulable states (Factor V Leiden, Cancer, OCP, etc. )  []Prolonged immobility (hemiplegia/hemiparesis/post operative or any other extended immobilization)  [] Transferred from outside facility (Rehab or Long term care)  [] Age </= to 50

## 2019-11-25 NOTE — DISCHARGE NOTE PROVIDER - HOSPITAL COURSE
Patient is a 74M with a history of HTN, HLD, cerebral amyloid angiopathy with prior IPH who presented to the ED with an episode of speech disturbance. He was found on CTH to have a 1.1 x 1.1 x 1.4 cm temporal/parietal/occipital IPH likely secondary to CAA. He was not on any anticoagulation/antiplatelets at home due to prior history of IPH. He was admitted to the stroke service for close monitoring and repeat imaging. 74M w/ PMH of HLD, IPH (R. parieto-temporal s/p craniotomy in 2017) presented w/ transient episodes of speech disturbance on 11/23/19.  Patient woke up  morning of admission complaining of headache. Shortly afterwards he had multiple episodes of speech disturbance lasting from 5 minutes to 1 hour. No LOC. No jerking movements. No previous history of  seizures, migraines. No history of arrythmia. No AC. Was taken off ASA after last IPH.  CTH demonstrates 1.8 x 1.1 x 1.4 cm TPO parenchymal hemorrhage.  NIHSS: 0 MRS: 0 ICH: 1        CT Head No Cont (11.24.19)    Similar 1.3 x 1.7 cm left occipitotemporal parenchymal hemorrhage.    11.23.19     CTA Neck: Mild atherosclerosis without significant flow-limiting stenosis     or evidence of acute dissection within the cervical carotid or vertebral     arteries.    CTA Head: No proximallarge vessel occlusion.     Left temporal occipital parenchymal hemorrhage as described on     noncontrast CT head performed concurrently.    CT Brain Stroke Protocol (11.23.19)    Acute 1.8 x 1.1 x 1.4 cm left temporooccipital parenchymal hemorrhage. No     significant mass effect, midline shift, or hydrocephalus.        Impression: Nontraumatic left hemispheric cortically located ICH. Chronic Right tempo-parieto-occipital ICH -etiology cerebral amyloid angiopathy. Previous pathology done and CAA was confirmed.         ANTITHROMBOTIC THERAPY: none in setting of ICH, increased risk of bleeding in setting of CAA, no neurological indication.        Patient evaluated PT/OT and was recommended home with outpatient services.  Patient stable for discharge.

## 2019-11-25 NOTE — SPEECH LANGUAGE PATHOLOGY EVALUATION - COMMENTS
MRI brain showed stable right parietal occipital ICH with surrounding vasogenic edema and minimal IVH, Pathology confirmed CAA. On 11/23/19 He developed transient episodes of speech disturbance now back to baseline. On exam LHH and L. nasolabial flattening (chronic). CTH demonstrates 1.8 x 1.1 x 1.4 cm L parenchymal hemorrhage.   CT Brain: FINDINGS: Acute 1.8 x 1.1 x 1.4 cm parenchymal hemorrhage (2:11 and 601:46) within the left temporal occipital region with mild surrounding edema. No midline shift, extra-axial collection, or hydrocephalus. Extensive patchy hypodensities within the periventricular and subcortical white matter, although nonspecific, likely reflect chronic microvascular disease. Chronic left parieto-occipital encephalomalacia and gliosis with overlying right parietal occipital craniotomy. Paranasal sinuses and mastoid air cells are clear. Status post bilateral cataract surgery. IMPRESSION: Acute 1.8 x 1.1 x 1.4 cm left temporooccipital parenchymal hemorrhage. No significant mass effect, midline shift, or hydrocephalus.  Per neuro: neurologically without acute change, neurological monitoring as patient with cerebral edema, mass effect, and brain compression, maintain normotension with SBP goal < 140mmHg for now, home statin if applicable.   Seen by Rehab Medicine. Recommended dysphagia eval and treat. Diet regular. Pt passed dysphagia screen in ED.   Chest x-ray: IMPRESSION: Heart size is within normal limits. Mild bibasilar areas of linear atelectasis. No pneumothorax. Mild calcification involving the aortic arch. No pneumothorax.

## 2019-11-25 NOTE — SPEECH LANGUAGE PATHOLOGY EVALUATION - SLP DIAGNOSIS
Chart reviewed. SLP attempted Speech-Language and Cognitive evaluation; however, patient discharged from facility.

## 2021-04-01 NOTE — DISCHARGE NOTE PROVIDER - CARE PROVIDERS DIRECT ADDRESSES
[email or web address]

## 2021-09-30 NOTE — ED ADULT NURSE REASSESSMENT NOTE - COMFORT CARE
Pt to infusion area for #2 0f 5 venofer infusions. Labs on 9/7 showed iron of 40 and saturation of 9%. She did have venofer a year ago and tolerated it well. PIV started left AC. Venofer given slowly with positive blood return through out.  No adverse react
plan of care explained/placed on 2 l nasal cannula per dr alonzo
wait time explained/plan of care explained/treatment delay explained

## 2022-01-12 NOTE — DIETITIAN INITIAL EVALUATION ADULT. - ENERGY NEEDS
Impression: Other chronic allergic conjunctivitis: H10.45. Plan: Conjunctivitis can be inflammatory (allergic) or infectious (viral or bacterial). Conjunctivitis may improve with ophthalmic antibiotics or combination antibiotic and steroid eye drops. Contact the office if there is no improvement or if it worsens despite treatment or if vision gets blurry. Start tobradex QID OD x1 week then D/C. height: 5'8" weight: 188 pounds BMI: 29 IBW: 154 pounds (+/-10%)  pertinent info: patient admitted with right MCA stroke, with hemorrhagic conversion, s/p craniotomy for evacuation   skin: no edema, no pressure injuries per documentation

## 2022-03-07 NOTE — PHYSICAL THERAPY INITIAL EVALUATION ADULT - ADDITIONAL COMMENTS
Spoke to pt. Informed to call central scheduling and re-schedule the CTA. Pt lives alone in a house with 4-5 steps to enter, +HR and 1 flight of stairs to the basement, +HR. Pt was independent with all ADLs and ambulation PTA. Pt did not use a AD for ambulation PTA. Pt owns RW, cane and shower chair. Pt with 1st floor setup. Pt is R hand dominant. +Bifocal eyeglasses.

## 2022-09-02 ENCOUNTER — APPOINTMENT (OUTPATIENT)
Dept: OTOLARYNGOLOGY | Facility: CLINIC | Age: 77
End: 2022-09-02

## 2022-09-02 VITALS
SYSTOLIC BLOOD PRESSURE: 135 MMHG | DIASTOLIC BLOOD PRESSURE: 72 MMHG | HEART RATE: 56 BPM | WEIGHT: 185 LBS | HEIGHT: 69 IN | BODY MASS INDEX: 27.4 KG/M2 | TEMPERATURE: 98.7 F

## 2022-09-02 DIAGNOSIS — H90.3 SENSORINEURAL HEARING LOSS, BILATERAL: ICD-10-CM

## 2022-09-02 DIAGNOSIS — H91.92 UNSPECIFIED HEARING LOSS, LEFT EAR: ICD-10-CM

## 2022-09-02 DIAGNOSIS — H90.5 UNSPECIFIED SENSORINEURAL HEARING LOSS: ICD-10-CM

## 2022-09-02 DIAGNOSIS — H61.23 IMPACTED CERUMEN, BILATERAL: ICD-10-CM

## 2022-09-02 PROCEDURE — 99204 OFFICE O/P NEW MOD 45 MIN: CPT | Mod: 25

## 2022-09-02 PROCEDURE — 92567 TYMPANOMETRY: CPT

## 2022-09-02 PROCEDURE — 92557 COMPREHENSIVE HEARING TEST: CPT

## 2022-09-02 PROCEDURE — 92504 EAR MICROSCOPY EXAMINATION: CPT

## 2022-09-05 PROBLEM — H91.92 DEAFNESS IN LEFT EAR: Status: ACTIVE | Noted: 2022-09-05

## 2022-09-05 PROBLEM — H90.5 PERCEIVED HEARING LOSS: Status: ACTIVE | Noted: 2022-09-05

## 2022-09-05 PROBLEM — H61.23 BILATERAL IMPACTED CERUMEN: Status: ACTIVE | Noted: 2022-09-05

## 2022-09-05 NOTE — PHYSICAL EXAM
[Binocular Microscopic Exam] : Binocular microscopic exam was performed [Hearing Loss Right Only] : diminished [Hearing Loss Left Only] : diminished [Rinne Test Air Conduction Persists > Bone Conduction Right] : air conduction greater than bone conduction on the right [Rinne Test Air Conduction Persists > Bone Conduction Left] : air conduction greater than bone conduction on the left [Hearing Reyes Test (Tuning Fork On Forehead)] : no lateralization of tone [FreeTextEntry8] : cerumen impaction. removed [FreeTextEntry9] : cerumen impaction. removed [Normal] : mucosa is normal [Midline] : trachea located in midline position

## 2022-09-05 NOTE — HISTORY OF PRESENT ILLNESS
[de-identified] : 77Y M presents for decreased hearing for 3 days\par His son states his dad noticed that he had some muffed sensation in this right ear\par Patient had loss hearing in this left ear for greater than 10 years. Unknown etiology. \par Gradually loss over 20 years\par 5 years ago stroke left him with loss of vision in his left eyes\par Dementia \par Started had significant right hearing loss. Already had issue with right ear \par Had used hearing aids and BiCROS. Currently not using them\par Last audiogram more than 5 year ago

## 2022-09-05 NOTE — REVIEW OF SYSTEMS
[Seasonal Allergies] : seasonal allergies [Post Nasal Drip] : post nasal drip [Hearing Loss] : hearing loss [Nasal Congestion] : nasal congestion [Sinus Pain] : sinus pain [Sinus Pressure] : sinus pressure [Negative] : Constitutional [As Noted in HPI] : as noted in HPI

## 2022-09-05 NOTE — ASSESSMENT
[FreeTextEntry1] : 77 year M  with bilateral cerumen impaction, bilateral SNHL, left ear deafness. Patient tolerated cerumen removed without complaints. Patient states after cerumen removal he is back to baseline hearing\par \par Recommend:\par Cerumen Impaction\par -Discussed not using q-tips or instruments to remove wax\par -Olive or mineral oil 1x times every 2 week to keep ear canal lubricated. Discussed that the ear is a self cleaning structure and just allow it clean itself. If wax builds up can try debrox. Once it gets impacted recommend return to get it cleaned out.\par \par SNHL\par -Discussed Benefit of Hearings Aids and their value of helping keep brain stimulated by helping hear conversation which keeps a person active and socially connected. Stressed also the association with a lower risk of incident dementia and slower cognitive decline.\par -Clearance Hearing Aid Evaluation Given for right hearing aid and BiCROS\par \par Left sided Sensory neural deafness\par -Discussed with patient that if hearing doesn't improve after 1 year of the insult, hearing typically does not return\par -Patient/Son states he is not interesting in evaluation of cochlear implant at this time\par \par -Return to clinic annually for hearing loss monitoring or sooner if new/worsen symptoms present \par

## 2022-09-05 NOTE — DATA REVIEWED
[de-identified] : An audiogram was ordered and performed including pure tones, tympanometry and speech testing for the patients complaint of hearing loss\par I have independently reviewed the patient's audiogram from today and my findings include \par AD Normal to profound SNHL 250-8k hz. AS Profound SNHL 250-8k hz. AU Tymp A

## 2022-09-05 NOTE — REASON FOR VISIT
[Initial Consultation] : an initial consultation for [Hearing Loss] : hearing loss [FreeTextEntry2] : sudden hearing loss

## 2023-01-26 NOTE — ED ADULT NURSE REASSESSMENT NOTE - PUPILS PERRL
Advancement Flap (Single) Text: The defect edges were debeveled with a #15 scalpel blade.  Given the location of the defect and the proximity to free margins a single advancement flap was deemed most appropriate.  Using a sterile surgical marker, an appropriate advancement flap was drawn incorporating the defect and placing the expected incisions within the relaxed skin tension lines where possible.    The area thus outlined was incised deep to adipose tissue with a #15 scalpel blade.  The skin margins were undermined to an appropriate distance in all directions utilizing iris scissors. yes/2mm bilat

## 2023-05-22 ENCOUNTER — APPOINTMENT (OUTPATIENT)
Dept: UROLOGY | Facility: CLINIC | Age: 78
End: 2023-05-22
Payer: MEDICARE

## 2023-05-22 ENCOUNTER — OUTPATIENT (OUTPATIENT)
Dept: OUTPATIENT SERVICES | Facility: HOSPITAL | Age: 78
LOS: 1 days | End: 2023-05-22
Payer: MEDICARE

## 2023-05-22 ENCOUNTER — RESULT REVIEW (OUTPATIENT)
Age: 78
End: 2023-05-22

## 2023-05-22 ENCOUNTER — APPOINTMENT (OUTPATIENT)
Dept: ULTRASOUND IMAGING | Facility: IMAGING CENTER | Age: 78
End: 2023-05-22
Payer: MEDICARE

## 2023-05-22 VITALS
SYSTOLIC BLOOD PRESSURE: 129 MMHG | DIASTOLIC BLOOD PRESSURE: 73 MMHG | HEART RATE: 59 BPM | RESPIRATION RATE: 16 BRPM | OXYGEN SATURATION: 95 % | TEMPERATURE: 98.8 F

## 2023-05-22 DIAGNOSIS — N50.811 RIGHT TESTICULAR PAIN: ICD-10-CM

## 2023-05-22 DIAGNOSIS — R35.1 NOCTURIA: ICD-10-CM

## 2023-05-22 PROCEDURE — 99204 OFFICE O/P NEW MOD 45 MIN: CPT

## 2023-05-22 PROCEDURE — 76870 US EXAM SCROTUM: CPT

## 2023-05-22 PROCEDURE — 76870 US EXAM SCROTUM: CPT | Mod: 26

## 2023-05-22 PROCEDURE — 51798 US URINE CAPACITY MEASURE: CPT

## 2023-05-22 RX ORDER — DOXYCYCLINE HYCLATE 100 MG/1
100 TABLET ORAL
Qty: 60 | Refills: 0 | Status: ACTIVE | COMMUNITY
Start: 2023-05-22 | End: 1900-01-01

## 2023-05-23 LAB
C TRACH RRNA SPEC QL NAA+PROBE: NOT DETECTED
N GONORRHOEA RRNA SPEC QL NAA+PROBE: NOT DETECTED
SOURCE AMPLIFICATION: NORMAL

## 2023-05-23 RX ORDER — DOXYCYCLINE HYCLATE 100 MG/1
100 CAPSULE ORAL
Qty: 60 | Refills: 0 | Status: ACTIVE | COMMUNITY
Start: 2023-05-23 | End: 1900-01-01

## 2023-05-27 LAB — URINE CULTURE <10: ABNORMAL

## 2023-05-27 NOTE — HISTORY OF PRESENT ILLNESS
[Nocturia] : nocturia [FreeTextEntry1] : : 1945 \par Referring Provider: BARBI ONEAL MD \par \par HPI: Mr. NIK OBANDO is a 78 year yo M with a PMHx notable for right testicular pain for the last 15 years. Has had worsening pain for the last week. No trouble with urinating. Nocturia x 3. No blood in the urine and no dysuria. Last sexual contact with someone was 2 years ago. Has never had a scrotal ultrasound. Random  cc. \par \par Anticoagulation: no AC\par All: NKDA\par Social: former smoker, social EtOH, previously , with children\par PMHx:dementia, hemorrhagic bleed, GERD\par FHx: mother with breast cancer, no other cancers\par PSHx: back surgery, hernia surgery (groin)\par \par Imaging: nothing recently\par  [Urinary Incontinence] : no urinary incontinence [Urinary Retention] : no urinary retention [Urinary Urgency] : no urinary urgency [Urinary Frequency] : no urinary frequency

## 2023-05-27 NOTE — PHYSICAL EXAM
[General Appearance - Well Developed] : well developed [Heart Rate And Rhythm] : Heart rate and rhythm were normal [] : no respiratory distress [Respiration, Rhythm And Depth] : normal respiratory rhythm and effort [Bowel Sounds] : normal bowel sounds [Abdomen Soft] : soft [Normal Station and Gait] : the gait and station were normal for the patient's age [Skin Color & Pigmentation] : normal skin color and pigmentation [Skin Turgor] : supple [No Focal Deficits] : no focal deficits [Oriented To Time, Place, And Person] : oriented to person, place, and time [Not Anxious] : not anxious [Penis Abnormality] : normal uncircumcised penis [Testes Mass (___cm)] : there were no testicular masses [FreeTextEntry1] : no obvious fluctuance, right epididymal tenderness

## 2023-07-11 ENCOUNTER — APPOINTMENT (OUTPATIENT)
Dept: UROLOGY | Facility: CLINIC | Age: 78
End: 2023-07-11

## 2023-10-14 NOTE — ED PROVIDER NOTE - DISPOSITION TYPE
Dear Dr. Mendoza:    I had the pleasure of meeting Kristen Chapman in consultation today at the [facility] at your request.     History of Present Illness:   Kristen Chapman is a 69 year old referred for evaluation of recurrent metastatic nasopharyngeal cancer. He was diagnosed with a [id number] nasopharyngeal cancer in 2020. He was treated with concurrent chemoradiation at [facility] with Dr Frias and Dr Mendoza. He received 7000 cGy from 3/2/2020 to 4/17/2020. He had 3 cycles of cisplatin/5FU. He had no PEG during treatment then had issues upon completion of treatment requiring PEG placement. He had recurrent disease in the right ethmoids, biopsied 9/16/2021. Imaging raised concerns for metastatic disease in the mediastinum and right level IB/IIA. His case was discussed at tumor board and recommended for chemotherapy. He had U/S guided 10/19/2021 showing metastatic disease. Dr Frias determined the patient was not a candidate for chemotherapy. He was treated with SBRT from 11/8/2021 to 11/17/2021 for a total of 3500 cGy. He was started on pembrolizumab 12/23/2021, discontinued after 7/1/2022. He had a CT scan 6/15/2023 which showed a 2.0 x 1.9 cm hyperenhancing area in the right level IB. He had an U/S guided FNA on 8/31/2023 which showed metastatic SCC. He had a MRI brain on 9/6/2023 which showed 2.1 x 2.2 x 2.5 cm mass in right level IB consistent with metastatic SCC. He had a PET scan on 9/11/2023 which showed 2.8 x 1.2 x 3.0 cm FDG avid right level IB without distant disease.    He last saw medical oncology in September 2023.    He last saw radiation oncology 9/22/2023 to discuss SBRT to the neck.     His last TSH was 31, T4 elevated to 1.86. He is on 175 mcg of synthroid.       The mass has been growing in size. He is not having pain but is having irritation. He has not noticed any drainage from the skin. He started having skin changes in September. He is having difficulty with his swallowing  since the radiation. His daughter said that he only went to speech therapy a few times during treatment then stopped because of COVID and wanting to limit appointments. He is doing soft foods. He feels like foods are getting stuck when he swallows. He feels like meat will not go down. He has to bring things up or washes it down with water. He does not think things go down the wrong way. He has no coughing on water. He is losing weight. He has no pain in his ears. He is having ringing in the ears.       He is accompanied by his daughter who supplements history.       Past medical history: recurrent nasopharyngeal carcinoma, chronic hepatitis B, recurrent ascites, cirrhosis    Past surgical history: none    Social history: No smoking. No chewing tobacco or betal nut. He lives with children and wife. He is not working. He is retired.     Family history: his brother had nasopharyngeal cancer    MEDICATIONS:     Current Outpatient Medications   Medication Sig Dispense Refill    acetaminophen (TYLENOL) 325 MG tablet Take 650 mg by mouth every 6 hours as needed       ARTIFICIAL TEARS 1.4 % ophthalmic solution 1-2 drops as needed      artificial tears OINT ophthalmic ointment Place 1 g into the right eye At Bedtime 1-2 drops into eye 7 g 3    entecavir (BARACLUDE) 1 MG tablet Take 1 tablet (1 mg) by mouth daily 90 tablet 3    hypromellose-dextran (HYPROMELLOSE-DEXTRAN 0.3-0.1%) 0.1-0.3 % ophthalmic solution Place 1 drop into the right eye daily as needed for dry eyes 30 mL 3    lamiVUDine (EPIVIR) 100 MG tablet TAKE 1 TABLET (100 MG) BY MOUTH DAILY 90 tablet 0    levothyroxine (SYNTHROID/LEVOTHROID) 175 MCG tablet Take 1 tablet (175 mcg) by mouth daily 30 tablet 2    Nutritional Supplements (BOOST HIGH PROTEIN PO) Take 1 Bottle by mouth 4 times daily      ondansetron (ZOFRAN) 4 MG tablet Take 1-2 tablets (4-8 mg) by mouth every 6 hours as needed for nausea 30 tablet 3    sodium chloride (OCEAN) 0.65 % nasal spray Spray 2  sprays in nostril 2 times daily (Patient taking differently: Spray 2 sprays in nostril daily as needed) 30 mL 11    calcium carbonate 750 MG CHEW Take 750 mg by mouth daily (Patient not taking: Reported on 10/2/2023)         ALLERGIES:    Allergies   Allergen Reactions    Oxycodone Itching       HABITS/SOCIAL HISTORY:   No smoking. No chewing tobacco or betal nut.   He lives with children and wife.   He is not working.   He is retired.     Social History     Socioeconomic History    Marital status:      Spouse name: Not on file    Number of children: Not on file    Years of education: Not on file    Highest education level: Not on file   Occupational History    Not on file   Tobacco Use    Smoking status: Never    Smokeless tobacco: Never   Substance and Sexual Activity    Alcohol use: Not Currently    Drug use: Not Currently    Sexual activity: Not Currently   Other Topics Concern    Not on file   Social History Narrative    Not on file     Social Determinants of Health     Financial Resource Strain: Not on file   Food Insecurity: Not on file   Transportation Needs: Not on file   Physical Activity: Not on file   Stress: Not on file   Social Connections: Not on file   Interpersonal Safety: Not on file   Housing Stability: Not on file       PAST MEDICAL HISTORY:   Past Medical History:   Diagnosis Date    Anemia     Dysphagia     Hepatitis B chronic     Hypothyroidism     Nasopharyngeal cancer (H)     Severe protein-calorie malnutrition (H)     Thrombocytopenia (H)         PAST SURGICAL HISTORY:   Past Surgical History:   Procedure Laterality Date    ENDOSCOPIC ENDONASAL SURGERY Bilateral 9/7/2021    Procedure: Nasal Endoscopy with Biopsy;  Surgeon: Ky Centeno MD;  Location: [facility] OR    IR CHEST PORT PLACEMENT > 5 YRS OF AGE  3/2/2020    IR CHEST PORT PLACEMENT > 5 YRS OF AGE  3/2/2020    IR GASTROSTOMY TUBE INSERTION  4/27/2020    IR GASTROSTOMY TUBE PERCUTANEOUS PLCMNT  4/27/2020    IR PORT PLACEMENT  ">5 YEARS  3/2/2020    IR PORT PLACEMENT >5 YEARS  3/2/2020    IR T-FASTENER REMOVAL  6/5/2020    IR T-FASTENER REMOVAL  6/5/2020       FAMILY HISTORY:    Family History   Family history unknown: Yes       REVIEW OF SYSTEMS:  12 point ROS was negative other than the symptoms noted above in the HPI.  Patient Supplied Answers to Review of Systems      10/16/2023     7:39 AM   [facility] ROS   Constitutional Problems with sleep   Ears, Nose, Throat Hearing loss    Ringing/noise in ears    Trouble swallowing   Gastrointestinal/Genitourinary Heartburn/indigestion   Musculoskeletal Back pain   Endocrine Heat or cold intolerance         PHYSICAL EXAMINATION:   /73 (BP Location: Right arm, Patient Position: Sitting, Cuff Size: Adult Regular)   Pulse 62   Ht 1.626 m (5' 4\")   Wt 63 kg (139 lb)   SpO2 96%   BMI 23.86 kg/m    Appearance:   normal; NAD, age-appropriate appearance, well-developed, normal habitus  Abdominal fullness consistent with ascites   Communication:   normal; communicates verbally, normal voice quality   Head/Face:   inspection -  Normal; no scars or visible lesions   Palpation - no facial numbness   Facial strength -  Normal and symmetric    Skin:  normal, no rash   Ocular Motility:  normal occular movements   Ears:  auricle (AD) -  normal  EAC (AD) -  normal  TM (AD) -  Normal, no effusion  auricle (AS) -  normal  EAC (AS) -  normal  TM (AS) -  Normal, no effusion  Normal clinical speech reception   Nose:  Ext. inspection -  Normal  Internal Inspection -  dry nasal mucosa, no obvious tumor, crusting   Nasopharynx:  Radiation changes to the mucosa, no masses, crusting present   Oral Cavity:  lips -  Normal mucosa, oral competence, and stoma size  Edentulous  Healthy gingival mucosa  Some thickened secretions   Hard palate, buccal, floor of mouth mucosa normal  Unable to palpate mass in floor of mouth   Tongue - normal movement, no lesions   Oropharynx:  mucosa -  radiation changes  soft palate -  " Normal, no lesions, no asymmetry, normal elevation  Base of tongue - normal   Hypopharynx:  Extensive radiation changes to the mucosa  Some thickened secretions   Larynx:  Epiglottis, AE folds, false vocal cords, true vocal cords, arytenoids normal in appearance with exception of radiation changes, very dry mucosa  Mild foreshortening of AE folds  Epiglottis is mildly omega shaped  bilaterally mobile cords    Neck: Visible right neck mass in level IB  Skin involvement of tumor of at least 2.5 cm with obvious tumor involvement in the skin  Palpable mass of about 2.5 cm in size - immobile, inseparable from mandible  Fibrosis of neck skin muscle  Thin skin with some mild laxity   Lymphatic:  no abnormal nodes   Cardiovascular:  warm, pink, well-perfused extremities without swelling, tenderness, or edema   Respiratory:  Normal respiratory effort, no stridor   Neuro/Psych.:  mood/affect -  normal  mental status -  normal       PROCEDURES:   Flexible fiberoptic laryngoscopy: Scope exam was indicated due to nasopharyngeal cancer. Verbal consent was obtained. The nasal cavity was prepped with an aerosolized solution of topical anesthetic and vasoconstrictive agent. The scope was passed through the anterior nasal cavity and advanced. There is dry nasal mucosa with radiation changes, some crusting. No obvious tumor. Inspection of the nasopharynx revealed radiation changes, no tumor, some crusting. The base of tongue and vallecula are normal. The epiglottis is slightly omega shaped with mild foreshortening of the AE folds. The epiglottis, AE folds, false cords, true cords, arytenoids are normal with the exception of radiation changes to the mucosa with very dry mucosa. Inspection of the larynx revealed bilaterally mobile vocal cords. Pyriform sinuses are symmetric. The airway is patent. Procedure tolerated well with no immediate complications noted.              RESULTS REVIEWED:   I reviewed note from radiation oncology, from  ID, note from medical oncology, PET report, MRI brain report, CT report    TSH/T4 reviewed    I independently reviewed CT neck imaging and PET imaging    Care discussed with SLP      IMPRESSION AND PLAN:   Kristen Chapman is a 69 year old man with a history of nasopharyngeal cancer s/p chemoradiation in 2020. He had recurrence locally treated with SBRT in 2021. He now has recurrence in the right neck with chandler skin involvement, potential mandible involvement.    I discussed with him that treatment would be with a right neck dissection. We discussed the risks of the procedure. We discussed the risk to the marginal mandibular nerve. I am concerned that this nerve may not be able to be saved based on his exam and imaging. We discussed the consequences of this today. We discussed the risks to the hypoglossal nerve, spinal accessory nerve, vagus nerve, phrenic nerve. We discussed the risks of bleeding and infection. We discussed the risk of scarring.     I explained to him that I am concerned that at a minimum he will potentially need a marginal mandibulectomy as the tumor is immobile relative to the mandible. We would plan on repeat imaging prior to surgery to help with surgical planning to ensure there is no chandler mandible involvement. We discussed that if he requires a segmental mandibulectomy that this would be a much larger surgery with more complex reconstruction. We would potentially need to plate the mandible.    I explained to him that given the skin involvement with need to resect a large area of skin, that we need to prepare to do reconstruction at the time of surgery. This would potentially be done with a pectoralis flap if only soft tissue were required. He does not have any significant skin laxity of the neck.    He would need to be admitted to the hospital for several days after surgery. We discussed that with his liver issues it certainly could result in longer hospitalization with potential for  complications.    He is at increased wound healing complications especially in light of his poorly controlled hypothyroidism. He is on synthroid but TSH is still 31, with T4 elevated on recent check in August 2023. We will recheck again prior to surgery. We discussed that we would not wait on his TSH to be corrected to do surgery and just accept the risks of wound healing complications given the malignancy.    I explained that normally this type of tumor recurrence should be treated with postoperative chemoradiation. I would be concerned about further radiation to this area especially with the mandible risk of ORN but ultimately this would be up to radiation oncology. It sounds like from the med onc notes that he would not be a candidate for chemotherapy. Without adjuvant therapy he is at very high risk for tumor recurrence.     We discussed that surgery is his only curative option. I discussed with the patient and his family that I strongly recommend against radiation alone to this area. This will likely result in an open wound in the area, potentially communicating to the mandible. Chemotherapy alone would not be curative. He may not be a candidate for chemotherapy regardless per the notes from his previous treatment team.    We discussed that without treatment his tumor would be expected to grow, further involve skin, may drain, may erode the mandible, would be expected to cause increased pain.     He would need to undergo clearance with PAC. He does have recurrent ascites in the setting of hepatitis B. He just had his ascites drained recently and this has already reaccumulated.     We will review his imaging at tumor board and contact him with the discussion.    I would like to obtain a repeat contrasted neck CT within a few weeks of surgery to ensure this does not change our surgical plans - especially assess for inferior mandible involvement. He has chandler progression on his imaging from June vs September vs  physical exam today.    I had him see our SLP team today, as he did not work with SLP during treatment and has dysphagia. A video swallow study was ordered but he would prefer not to schedule yet.    We will work on finding a surgical date if he decides to proceed. Orders were placed today. He was encouraged to take some time to think about the discussion today.      Thank you very much for the opportunity to participate in the care of your patient.      Yuliana Perez MD  Otolaryngology- Head & Neck Surgery      This note was dictated with voice recognition software and then edited. Please excuse any unintentional errors.       More than 60 minutes was spent on patient visit and charting activities on date of service, excluding time spent on procedures.        CC:  Laurita Mendoza MD  [id number] [facility] [id number]   ADMIT

## 2024-05-15 NOTE — ED PROVIDER NOTE - ENMT NEGATIVE STATEMENT, MLM
98 Ears: no ear pain and no hearing problems.Nose: no nasal congestion and no nasal drainage.Mouth/Throat: no dysphagia, no hoarseness and no throat pain.Neck: no lumps, no pain, no stiffness and no swollen glands.

## 2025-02-17 NOTE — ED ADULT TRIAGE NOTE - RESPIRATORY RATE (BREATHS/MIN)
18 Quality 226: Preventive Care And Screening: Tobacco Use: Screening And Cessation Intervention: Patient screened for tobacco use and is an ex/non-smoker Detail Level: Detailed